# Patient Record
Sex: FEMALE | Race: WHITE | Employment: UNEMPLOYED | URBAN - METROPOLITAN AREA
[De-identification: names, ages, dates, MRNs, and addresses within clinical notes are randomized per-mention and may not be internally consistent; named-entity substitution may affect disease eponyms.]

---

## 2017-02-02 LAB
CRL: NORMAL CM
SAC DIAMETER: NORMAL CM

## 2018-02-22 ENCOUNTER — OFFICE VISIT (OUTPATIENT)
Dept: FAMILY MEDICINE CLINIC | Age: 33
End: 2018-02-22

## 2018-02-22 VITALS
TEMPERATURE: 95.2 F | WEIGHT: 118 LBS | SYSTOLIC BLOOD PRESSURE: 90 MMHG | DIASTOLIC BLOOD PRESSURE: 72 MMHG | HEART RATE: 64 BPM | RESPIRATION RATE: 12 BRPM | BODY MASS INDEX: 20.39 KG/M2 | OXYGEN SATURATION: 99 %

## 2018-02-22 DIAGNOSIS — R68.89 FLU-LIKE SYMPTOMS: Primary | ICD-10-CM

## 2018-02-22 DIAGNOSIS — R11.0 NAUSEA: ICD-10-CM

## 2018-02-22 DIAGNOSIS — R07.0 THROAT PAIN: ICD-10-CM

## 2018-02-22 DIAGNOSIS — R10.30 LOWER ABDOMINAL PAIN: ICD-10-CM

## 2018-02-22 LAB
BILIRUBIN, POC: NEGATIVE
BLOOD URINE, POC: NORMAL
CLARITY, POC: CLEAR
COLOR, POC: NORMAL
GLUCOSE URINE, POC: NEGATIVE
INFLUENZA A ANTIBODY: NEGATIVE
INFLUENZA B ANTIBODY: NEGATIVE
KETONES, POC: NEGATIVE
LEUKOCYTE EST, POC: NORMAL
NITRITE, POC: NEGATIVE
PH, POC: 6
PROTEIN, POC: 30
S PYO AG THROAT QL: NORMAL
SPECIFIC GRAVITY, POC: 1.03
UROBILINOGEN, POC: 0.2

## 2018-02-22 PROCEDURE — 99213 OFFICE O/P EST LOW 20 MIN: CPT | Performed by: FAMILY MEDICINE

## 2018-02-22 PROCEDURE — 87804 INFLUENZA ASSAY W/OPTIC: CPT | Performed by: FAMILY MEDICINE

## 2018-02-22 PROCEDURE — 81002 URINALYSIS NONAUTO W/O SCOPE: CPT | Performed by: FAMILY MEDICINE

## 2018-02-22 PROCEDURE — 87880 STREP A ASSAY W/OPTIC: CPT | Performed by: FAMILY MEDICINE

## 2018-02-22 RX ORDER — PROMETHAZINE HYDROCHLORIDE 12.5 MG/1
TABLET ORAL
Qty: 30 TABLET | Refills: 0 | Status: SHIPPED | OUTPATIENT
Start: 2018-02-22 | End: 2018-03-22

## 2018-02-22 NOTE — PROGRESS NOTES
tenderness . No rebound / guarding, non distended. No hepatosplenomegaly. No CVA tenderness. Skin: no rashes. Non tender. ASSESSMENT/  PLAN:  Nury Mari was seen today for influenza. Diagnoses and all orders for this visit:    Flu-like symptoms  -     POCT Influenza A/B- negative   -     Push fluids - water. Crook diet. Throat pain  -     POCT rapid strep A - negative   Lower abdominal pain  -     POCT Urinalysis no Micro  Nausea  -     Push fluids - water. -     Crook/ BRAT diet. -     Promethazine (PHENERGAN) 12.5 MG tablet; Take 1-2 po q 6 hours prn nausea. F/u if no improvement 3-4d/ prn increased symptoms or if fever occurs.

## 2018-03-22 ENCOUNTER — OFFICE VISIT (OUTPATIENT)
Dept: FAMILY MEDICINE CLINIC | Age: 33
End: 2018-03-22

## 2018-03-22 VITALS
WEIGHT: 117 LBS | HEART RATE: 75 BPM | OXYGEN SATURATION: 98 % | SYSTOLIC BLOOD PRESSURE: 111 MMHG | TEMPERATURE: 95.2 F | BODY MASS INDEX: 20.22 KG/M2 | RESPIRATION RATE: 16 BRPM | DIASTOLIC BLOOD PRESSURE: 78 MMHG

## 2018-03-22 DIAGNOSIS — F41.9 ANXIETY: ICD-10-CM

## 2018-03-22 DIAGNOSIS — J06.9 ACUTE URI: Primary | ICD-10-CM

## 2018-03-22 DIAGNOSIS — J02.9 ACUTE PHARYNGITIS, UNSPECIFIED ETIOLOGY: ICD-10-CM

## 2018-03-22 LAB — S PYO AG THROAT QL: NORMAL

## 2018-03-22 PROCEDURE — 99214 OFFICE O/P EST MOD 30 MIN: CPT | Performed by: FAMILY MEDICINE

## 2018-03-22 PROCEDURE — 87880 STREP A ASSAY W/OPTIC: CPT | Performed by: FAMILY MEDICINE

## 2018-03-22 RX ORDER — CITALOPRAM 20 MG/1
20 TABLET ORAL DAILY
Qty: 30 TABLET | Refills: 5 | Status: SHIPPED | OUTPATIENT
Start: 2018-03-22 | End: 2019-08-13

## 2018-03-22 RX ORDER — AMOXICILLIN 875 MG/1
875 TABLET, COATED ORAL 2 TIMES DAILY
Qty: 20 TABLET | Refills: 0 | Status: SHIPPED | OUTPATIENT
Start: 2018-03-22 | End: 2018-04-01

## 2018-03-22 RX ORDER — ALPRAZOLAM 0.5 MG/1
0.5 TABLET ORAL 3 TIMES DAILY PRN
Qty: 30 TABLET | Refills: 2 | Status: SHIPPED | OUTPATIENT
Start: 2018-03-22 | End: 2018-11-29 | Stop reason: SDUPTHER

## 2018-03-22 NOTE — PROGRESS NOTES
No suicidal thoughts or ideation        Current Outpatient Prescriptions   Medication Sig Dispense Refill    ALPRAZolam (XANAX) 0.5 MG tablet Take 1 tablet by mouth 3 times daily as needed for Anxiety for up to 30 days. 30 tablet 2    citalopram (CELEXA) 20 MG tablet Take 1 tablet by mouth daily 30 tablet 5    amoxicillin (AMOXIL) 875 MG tablet Take 1 tablet by mouth 2 times daily for 10 days 20 tablet 0    therapeutic multivitamin-minerals (THERAGRAN-M) tablet Take 1 tablet by mouth daily.  promethazine (PHENERGAN) 12.5 MG tablet Take 1-2 po q 6 hours prn nausea. 30 tablet 0    ibuprofen (ADVIL;MOTRIN) 600 MG tablet Take 1 tablet by mouth every 8 hours as needed for Pain. 90 tablet 11     No current facility-administered medications for this visit. ASSESSMENT / PLAN:    1. Anxiety  Increased sx off meds  Resume celexa to 20mg qd  Resume prn xanax, use intermittent  See CSM  refills given as below  Pt aware of need for every 3 month medication followup appointments, and that medication refills for benzodiazepines, narcotics and/or stimulants will only be given at appointment. - ALPRAZolam (XANAX) 0.5 MG tablet; Take 1 tablet by mouth 3 times daily as needed for Anxiety for up to 30 days. Dispense: 30 tablet; Refill: 2    2. Acute URI  c/w viral URI  Cont over the counter/symptomatic treatment. Follow up for persistent symptoms in 7 to 10 days or sooner for worsening symptomatology     3. Acute pharyngitis, unspecified etiology  c/w viral URI  Cont over the counter/symptomatic treatment. Follow up for persistent symptoms in 7 to 10 days or sooner for worsening symptomatology   - POCT rapid strep A negative           Follow-up appointment:   Call or return to clinic prn if these symptoms worsen or fail to improve as anticipated. Discussed use, benefit, and side effects of all prescribed medications. Barriers to medication compliance addressed. All patient questions answered.   Pt voiced understanding. When applicable, patient's outside records were reviewed through [facility]. The patient has signed appropriate paperworks/consents. Dragon dictation software was used for parts of this progress note. All attempts were made to correct any errors and ensure accuracy.

## 2018-11-29 ENCOUNTER — OFFICE VISIT (OUTPATIENT)
Dept: FAMILY MEDICINE CLINIC | Age: 33
End: 2018-11-29
Payer: COMMERCIAL

## 2018-11-29 VITALS
HEART RATE: 77 BPM | TEMPERATURE: 96 F | RESPIRATION RATE: 12 BRPM | OXYGEN SATURATION: 100 % | BODY MASS INDEX: 20.8 KG/M2 | WEIGHT: 117.4 LBS | SYSTOLIC BLOOD PRESSURE: 96 MMHG | DIASTOLIC BLOOD PRESSURE: 67 MMHG | HEIGHT: 63 IN

## 2018-11-29 DIAGNOSIS — R61 SWEATING INCREASE: ICD-10-CM

## 2018-11-29 DIAGNOSIS — F41.9 ANXIETY: ICD-10-CM

## 2018-11-29 DIAGNOSIS — Z00.00 ROUTINE GENERAL MEDICAL EXAMINATION AT A HEALTH CARE FACILITY: Primary | ICD-10-CM

## 2018-11-29 DIAGNOSIS — Z00.00 ROUTINE GENERAL MEDICAL EXAMINATION AT A HEALTH CARE FACILITY: ICD-10-CM

## 2018-11-29 DIAGNOSIS — Z80.3 FAMILY HISTORY OF BREAST CANCER: ICD-10-CM

## 2018-11-29 DIAGNOSIS — Z86.59 HISTORY OF EATING DISORDER: ICD-10-CM

## 2018-11-29 DIAGNOSIS — N60.19 FIBROCYSTIC BREAST DISEASE (FCBD), UNSPECIFIED LATERALITY: ICD-10-CM

## 2018-11-29 LAB
HCT VFR BLD CALC: 40.2 % (ref 36–48)
HEMOGLOBIN: 13.1 G/DL (ref 12–16)
MCH RBC QN AUTO: 30.3 PG (ref 26–34)
MCHC RBC AUTO-ENTMCNC: 32.6 G/DL (ref 31–36)
MCV RBC AUTO: 93.1 FL (ref 80–100)
PDW BLD-RTO: 13.3 % (ref 12.4–15.4)
PLATELET # BLD: 163 K/UL (ref 135–450)
PMV BLD AUTO: 11.3 FL (ref 5–10.5)
RBC # BLD: 4.32 M/UL (ref 4–5.2)
WBC # BLD: 5.5 K/UL (ref 4–11)

## 2018-11-29 PROCEDURE — 99395 PREV VISIT EST AGE 18-39: CPT | Performed by: FAMILY MEDICINE

## 2018-11-29 RX ORDER — CITALOPRAM 20 MG/1
20 TABLET ORAL DAILY
Qty: 30 TABLET | Refills: 5 | Status: CANCELLED | OUTPATIENT
Start: 2018-11-29

## 2018-11-29 RX ORDER — DESVENLAFAXINE 50 MG/1
50 TABLET, EXTENDED RELEASE ORAL DAILY
Qty: 30 TABLET | Refills: 5 | Status: SHIPPED | OUTPATIENT
Start: 2018-11-29 | End: 2018-12-05 | Stop reason: SDUPTHER

## 2018-11-29 RX ORDER — ALPRAZOLAM 0.5 MG/1
0.5 TABLET ORAL 3 TIMES DAILY PRN
Qty: 30 TABLET | Refills: 2 | Status: SHIPPED | OUTPATIENT
Start: 2018-11-29 | End: 2018-12-29

## 2018-11-29 ASSESSMENT — PATIENT HEALTH QUESTIONNAIRE - PHQ9
SUM OF ALL RESPONSES TO PHQ QUESTIONS 1-9: 0
2. FEELING DOWN, DEPRESSED OR HOPELESS: 0
SUM OF ALL RESPONSES TO PHQ9 QUESTIONS 1 & 2: 0
SUM OF ALL RESPONSES TO PHQ QUESTIONS 1-9: 0
1. LITTLE INTEREST OR PLEASURE IN DOING THINGS: 0

## 2018-11-29 NOTE — PROGRESS NOTES
Here for well checkup, physical.  Pt states that things are doing well overall. Pt states she has noted some increase in sweating, mostly in axilla bilaterally. Sx are pretty random, and not related to exertion. Sx are random, not triggered by anything, except may relate to anxiety. Pt does have long-term anxiety issues, but not any significantly different than prior. Pt has noted a minimal increase in stress. No lymph node in neck, axilla. No fever, chills. No weight gain or loss    Pt is strugglin to stay asleep, falls asleep ok but struggles to stay asleep. Pt does not know what wakes her up, maybe related to her baby daughter getting up or other kids, but at times can be random. Tends to find a lot of the time that she does tend to think about a lot over the night. Pt uses 1/2 xanax prn and that does help but only uses prn. Pt does continue to talk with her psychologist and that has been very helpful. Pt has been given celexa for daily usage, but has never taken. Pt is concerned about weight gain with medication. pts aunt diet of breast cancer(50), and mother  of cancer but was not sure where it was initially located. Unknown primary. Pt wants to know about genetic testing and early testing. Except as noted in the history of present illness as above, the review of systems is negative for the following:    General ROS: negative  Psychological ROS: negative  Allergy and Immunology ROS: negative  Hematological and Lymphatic ROS: negative  Respiratory ROS: no cough, shortness of breath, or wheezing  Cardiovascular ROS: no chest pain or dyspnea on exertion  Gastrointestinal ROS: no abdominal pain, change in bowel habits, or black or bloody stools  Genito-Urinary ROS: no dysuria, trouble voiding, or hematuria  Musculoskeletal ROS: negative  Dermatological ROS: negative      Past medical, surgical, and social history reviewed and updated.    Medications and allergies reviewed and

## 2018-11-30 LAB
A/G RATIO: 1.7 (ref 1.1–2.2)
ALBUMIN SERPL-MCNC: 4.6 G/DL (ref 3.4–5)
ALP BLD-CCNC: 44 U/L (ref 40–129)
ALT SERPL-CCNC: 12 U/L (ref 10–40)
ANION GAP SERPL CALCULATED.3IONS-SCNC: 12 MMOL/L (ref 3–16)
AST SERPL-CCNC: 20 U/L (ref 15–37)
BILIRUB SERPL-MCNC: 0.5 MG/DL (ref 0–1)
BUN BLDV-MCNC: 14 MG/DL (ref 7–20)
CALCIUM SERPL-MCNC: 10 MG/DL (ref 8.3–10.6)
CHLORIDE BLD-SCNC: 104 MMOL/L (ref 99–110)
CHOLESTEROL, TOTAL: 181 MG/DL (ref 0–199)
CO2: 26 MMOL/L (ref 21–32)
CREAT SERPL-MCNC: 0.8 MG/DL (ref 0.6–1.1)
FOLATE: 10.83 NG/ML (ref 4.78–24.2)
GFR AFRICAN AMERICAN: >60
GFR NON-AFRICAN AMERICAN: >60
GLOBULIN: 2.7 G/DL
GLUCOSE BLD-MCNC: 74 MG/DL (ref 70–99)
HDLC SERPL-MCNC: 72 MG/DL (ref 40–60)
LACTATE DEHYDROGENASE: 139 U/L (ref 100–190)
LDL CHOLESTEROL CALCULATED: 93 MG/DL
POTASSIUM SERPL-SCNC: 4.3 MMOL/L (ref 3.5–5.1)
SODIUM BLD-SCNC: 142 MMOL/L (ref 136–145)
T4 FREE: 1.1 NG/DL (ref 0.9–1.8)
TOTAL PROTEIN: 7.3 G/DL (ref 6.4–8.2)
TRIGL SERPL-MCNC: 78 MG/DL (ref 0–150)
TSH SERPL DL<=0.05 MIU/L-ACNC: 1.05 UIU/ML (ref 0.27–4.2)
VITAMIN B-12: 533 PG/ML (ref 211–911)
VLDLC SERPL CALC-MCNC: 16 MG/DL

## 2018-12-03 ENCOUNTER — TELEPHONE (OUTPATIENT)
Dept: FAMILY MEDICINE CLINIC | Age: 33
End: 2018-12-03

## 2018-12-04 NOTE — TELEPHONE ENCOUNTER
PA submitted for Desvenlafaxine Succinate ER 50MG er tablets on [facility]  (Key: Kylee Herrera) - [id number]    Pending review

## 2018-12-06 RX ORDER — DESVENLAFAXINE 50 MG/1
50 TABLET, EXTENDED RELEASE ORAL DAILY
Qty: 30 TABLET | Refills: 5 | Status: SHIPPED | OUTPATIENT
Start: 2018-12-06 | End: 2019-08-13

## 2018-12-17 NOTE — TELEPHONE ENCOUNTER
Prior Authorization for medication,   Desvenlafaxine Succinate ER has been DENIED by insurance [facility] due to it being non formulary. Please advise on appeal or change in medications. Please see attached scanned denial letter for more information.

## 2018-12-18 NOTE — TELEPHONE ENCOUNTER
Called and spoke with pt and she states with the card it is still high at  $155 monthly. She states she does not want to call her insurance for more information and she prefers an alternative medication be sent into the pharmacy.  Please review

## 2019-02-11 RX ORDER — GENTAMICIN SULFATE 3 MG/ML
1 SOLUTION/ DROPS OPHTHALMIC 4 TIMES DAILY
Qty: 1 BOTTLE | Refills: 5 | Status: SHIPPED | OUTPATIENT
Start: 2019-02-11 | End: 2019-02-18

## 2019-03-08 ENCOUNTER — TELEPHONE (OUTPATIENT)
Dept: FAMILY MEDICINE CLINIC | Age: 34
End: 2019-03-08

## 2019-03-08 RX ORDER — OSELTAMIVIR PHOSPHATE 75 MG/1
75 CAPSULE ORAL DAILY
Qty: 10 CAPSULE | Refills: 0 | Status: SHIPPED | OUTPATIENT
Start: 2019-03-08 | End: 2019-03-18

## 2019-03-20 ENCOUNTER — PATIENT MESSAGE (OUTPATIENT)
Dept: FAMILY MEDICINE CLINIC | Age: 34
End: 2019-03-20

## 2019-03-20 DIAGNOSIS — T75.3XXA MOTION SICKNESS, INITIAL ENCOUNTER: Primary | ICD-10-CM

## 2019-03-20 RX ORDER — SCOLOPAMINE TRANSDERMAL SYSTEM 1 MG/1
1 PATCH, EXTENDED RELEASE TRANSDERMAL
Qty: 10 PATCH | Refills: 0 | Status: SHIPPED | OUTPATIENT
Start: 2019-03-20 | End: 2019-08-13

## 2019-05-21 ENCOUNTER — TELEPHONE (OUTPATIENT)
Dept: FAMILY MEDICINE CLINIC | Age: 34
End: 2019-05-21

## 2019-05-21 RX ORDER — GENTAMICIN SULFATE 3 MG/ML
1 SOLUTION/ DROPS OPHTHALMIC 3 TIMES DAILY
Qty: 1 BOTTLE | Refills: 0 | Status: SHIPPED | OUTPATIENT
Start: 2019-05-21 | End: 2019-05-28

## 2019-08-06 ENCOUNTER — NURSE TRIAGE (OUTPATIENT)
Dept: OTHER | Facility: CLINIC | Age: 34
End: 2019-08-06

## 2019-08-13 ENCOUNTER — OFFICE VISIT (OUTPATIENT)
Dept: FAMILY MEDICINE CLINIC | Age: 34
End: 2019-08-13
Payer: COMMERCIAL

## 2019-08-13 VITALS
HEART RATE: 65 BPM | TEMPERATURE: 98 F | DIASTOLIC BLOOD PRESSURE: 71 MMHG | WEIGHT: 112.8 LBS | SYSTOLIC BLOOD PRESSURE: 96 MMHG | BODY MASS INDEX: 19.82 KG/M2 | OXYGEN SATURATION: 99 %

## 2019-08-13 DIAGNOSIS — R07.9 CHEST PAIN, UNSPECIFIED TYPE: ICD-10-CM

## 2019-08-13 DIAGNOSIS — R51.9 DAILY HEADACHE: Primary | ICD-10-CM

## 2019-08-13 DIAGNOSIS — G43.009 MIGRAINE WITHOUT AURA AND WITHOUT STATUS MIGRAINOSUS, NOT INTRACTABLE: ICD-10-CM

## 2019-08-13 DIAGNOSIS — R53.83 FATIGUE, UNSPECIFIED TYPE: ICD-10-CM

## 2019-08-13 DIAGNOSIS — N92.1 MENORRHAGIA WITH IRREGULAR CYCLE: ICD-10-CM

## 2019-08-13 DIAGNOSIS — F41.9 ANXIETY: ICD-10-CM

## 2019-08-13 LAB
A/G RATIO: 1.9 (ref 1.1–2.2)
ALBUMIN SERPL-MCNC: 4.8 G/DL (ref 3.4–5)
ALP BLD-CCNC: 43 U/L (ref 40–129)
ALT SERPL-CCNC: 17 U/L (ref 10–40)
ANION GAP SERPL CALCULATED.3IONS-SCNC: 14 MMOL/L (ref 3–16)
AST SERPL-CCNC: 20 U/L (ref 15–37)
BILIRUB SERPL-MCNC: 0.5 MG/DL (ref 0–1)
BUN BLDV-MCNC: 14 MG/DL (ref 7–20)
CALCIUM SERPL-MCNC: 10.3 MG/DL (ref 8.3–10.6)
CHLORIDE BLD-SCNC: 103 MMOL/L (ref 99–110)
CO2: 27 MMOL/L (ref 21–32)
CREAT SERPL-MCNC: 0.9 MG/DL (ref 0.6–1.1)
FERRITIN: 60.6 NG/ML (ref 15–150)
FOLATE: >20 NG/ML (ref 4.78–24.2)
GFR AFRICAN AMERICAN: >60
GFR NON-AFRICAN AMERICAN: >60
GLOBULIN: 2.5 G/DL
GLUCOSE BLD-MCNC: 81 MG/DL (ref 70–99)
HCT VFR BLD CALC: 40.6 % (ref 36–48)
HEMOGLOBIN: 13.5 G/DL (ref 12–16)
IRON SATURATION: 31 % (ref 15–50)
IRON: 100 UG/DL (ref 37–145)
MCH RBC QN AUTO: 31.6 PG (ref 26–34)
MCHC RBC AUTO-ENTMCNC: 33.3 G/DL (ref 31–36)
MCV RBC AUTO: 94.7 FL (ref 80–100)
PDW BLD-RTO: 13 % (ref 12.4–15.4)
PLATELET # BLD: 188 K/UL (ref 135–450)
PMV BLD AUTO: 12 FL (ref 5–10.5)
POTASSIUM SERPL-SCNC: 4.2 MMOL/L (ref 3.5–5.1)
PREALBUMIN: 29.5 MG/DL (ref 20–40)
RBC # BLD: 4.29 M/UL (ref 4–5.2)
SODIUM BLD-SCNC: 144 MMOL/L (ref 136–145)
T4 FREE: 1 NG/DL (ref 0.9–1.8)
TOTAL IRON BINDING CAPACITY: 323 UG/DL (ref 260–445)
TOTAL PROTEIN: 7.3 G/DL (ref 6.4–8.2)
TSH SERPL DL<=0.05 MIU/L-ACNC: 1.42 UIU/ML (ref 0.27–4.2)
VITAMIN B-12: 570 PG/ML (ref 211–911)
VITAMIN D 25-HYDROXY: 39.2 NG/ML
WBC # BLD: 4.2 K/UL (ref 4–11)

## 2019-08-13 PROCEDURE — 93000 ELECTROCARDIOGRAM COMPLETE: CPT | Performed by: FAMILY MEDICINE

## 2019-08-13 PROCEDURE — 99214 OFFICE O/P EST MOD 30 MIN: CPT | Performed by: FAMILY MEDICINE

## 2019-08-14 LAB
ESTIMATED AVERAGE GLUCOSE: 108.3 MG/DL
HBA1C MFR BLD: 5.4 %

## 2019-08-15 ENCOUNTER — HOSPITAL ENCOUNTER (OUTPATIENT)
Dept: MRI IMAGING | Age: 34
Discharge: HOME | End: 2019-08-15
Payer: COMMERCIAL

## 2019-08-15 ENCOUNTER — OFFICE VISIT (OUTPATIENT)
Dept: FAMILY MEDICINE CLINIC | Age: 34
End: 2019-08-15
Payer: COMMERCIAL

## 2019-08-15 VITALS
HEART RATE: 65 BPM | OXYGEN SATURATION: 99 % | DIASTOLIC BLOOD PRESSURE: 71 MMHG | RESPIRATION RATE: 14 BRPM | SYSTOLIC BLOOD PRESSURE: 96 MMHG

## 2019-08-15 DIAGNOSIS — R42 DIZZINESS: ICD-10-CM

## 2019-08-15 DIAGNOSIS — G43.009 MIGRAINE WITHOUT AURA AND WITHOUT STATUS MIGRAINOSUS, NOT INTRACTABLE: ICD-10-CM

## 2019-08-15 DIAGNOSIS — R51.9 DAILY HEADACHE: Primary | ICD-10-CM

## 2019-08-15 DIAGNOSIS — R53.83 FATIGUE, UNSPECIFIED TYPE: ICD-10-CM

## 2019-08-15 DIAGNOSIS — N92.1 MENORRHAGIA WITH IRREGULAR CYCLE: ICD-10-CM

## 2019-08-15 DIAGNOSIS — R35.0 URINARY FREQUENCY: ICD-10-CM

## 2019-08-15 DIAGNOSIS — R51.9 DAILY HEADACHE: ICD-10-CM

## 2019-08-15 LAB
BILIRUBIN, POC: NEGATIVE
BLOOD URINE, POC: NEGATIVE
CLARITY, POC: ABNORMAL
COLOR, POC: YELLOW
CONTROL: NORMAL
FOLLICLE STIMULATING HORMONE: 9.9 MIU/ML
GLUCOSE URINE, POC: NEGATIVE
KETONES, POC: NEGATIVE
LEUKOCYTE EST, POC: ABNORMAL
LUTEINIZING HORMONE: 4 MIU/ML
NITRITE, POC: NEGATIVE
PH, POC: 6.5
PREGNANCY TEST URINE, POC: NEGATIVE
PROTEIN, POC: ABNORMAL
SPECIFIC GRAVITY, POC: 1.01
UROBILINOGEN, POC: 0.2

## 2019-08-15 PROCEDURE — 70553 MRI BRAIN STEM W/O & W/DYE: CPT

## 2019-08-15 PROCEDURE — 81025 URINE PREGNANCY TEST: CPT | Performed by: FAMILY MEDICINE

## 2019-08-15 PROCEDURE — 99214 OFFICE O/P EST MOD 30 MIN: CPT | Performed by: FAMILY MEDICINE

## 2019-08-15 PROCEDURE — 2580000003 HC RX 258: Performed by: FAMILY MEDICINE

## 2019-08-15 PROCEDURE — 6360000004 HC RX CONTRAST MEDICATION: Performed by: FAMILY MEDICINE

## 2019-08-15 PROCEDURE — 81002 URINALYSIS NONAUTO W/O SCOPE: CPT | Performed by: FAMILY MEDICINE

## 2019-08-15 PROCEDURE — A9579 GAD-BASE MR CONTRAST NOS,1ML: HCPCS | Performed by: FAMILY MEDICINE

## 2019-08-15 RX ORDER — SODIUM CHLORIDE 0.9 % (FLUSH) 0.9 %
10 SYRINGE (ML) INJECTION ONCE
Status: COMPLETED | OUTPATIENT
Start: 2019-08-15 | End: 2019-08-15

## 2019-08-15 RX ORDER — SULFAMETHOXAZOLE AND TRIMETHOPRIM 800; 160 MG/1; MG/1
1 TABLET ORAL 2 TIMES DAILY
Qty: 14 TABLET | Refills: 0 | Status: SHIPPED | OUTPATIENT
Start: 2019-08-15 | End: 2019-08-22

## 2019-08-15 RX ADMIN — Medication 10 ML: at 18:25

## 2019-08-15 RX ADMIN — GADOTERIDOL 9 ML: 279.3 INJECTION, SOLUTION INTRAVENOUS at 18:23

## 2019-08-15 ASSESSMENT — PATIENT HEALTH QUESTIONNAIRE - PHQ9
1. LITTLE INTEREST OR PLEASURE IN DOING THINGS: 0
SUM OF ALL RESPONSES TO PHQ QUESTIONS 1-9: 0
SUM OF ALL RESPONSES TO PHQ QUESTIONS 1-9: 0
2. FEELING DOWN, DEPRESSED OR HOPELESS: 0
SUM OF ALL RESPONSES TO PHQ9 QUESTIONS 1 & 2: 0

## 2019-08-15 NOTE — PROGRESS NOTES
Yellow     Clarity, UA Hazy     Glucose, UA POC Negative     Bilirubin, UA Negative     Ketones, UA Negative     Spec Grav, UA 1.010     Blood, UA POC Negative     pH, UA 6.5     Protein, UA POC Trace     Urobilinogen, UA 0.2     Leukocytes, UA Small     Nitrite, UA Negative          ASSESSMENT / PLAN:    1. Daily headache  Persistent sx, no neurologic sx  D/t persistent HA and increased frequency and severity, will check MRI brain   Management pending results. - MRI BRAIN W WO CONTRAST; Future    2. Fatigue, unspecified type  bloodwork normal, reviewed as above  Await MRI  ? If related to anxeity    3. Migraine without aura and without status migrainosus, not intractable  Await MRI brain  Management pending results. 4. Urinary frequency  UA with trace LE, check urine cx and tx with bactrim DS BID x 7d  - POCT Urinalysis no Micro  - POCT urine pregnancy  - Urine Culture    5. Dizziness  - MRI BRAIN W WO CONTRAST; Future           Follow-up appointment:   Pending MRI/prn    Discussed use, benefit, and side effects of all prescribed medications. Barriers to medication compliance addressed. All patient questions answered. Pt voiced understanding. When applicable, patient's outside records were reviewed through [facility]. The patient has signed appropriate paperworks/consents.

## 2019-08-17 LAB — URINE CULTURE, ROUTINE: NORMAL

## 2019-08-19 ENCOUNTER — TELEPHONE (OUTPATIENT)
Dept: FAMILY MEDICINE CLINIC | Age: 34
End: 2019-08-19

## 2019-08-20 ENCOUNTER — TELEPHONE (OUTPATIENT)
Dept: FAMILY MEDICINE CLINIC | Age: 34
End: 2019-08-20

## 2019-08-20 ENCOUNTER — OFFICE VISIT (OUTPATIENT)
Dept: FAMILY MEDICINE CLINIC | Age: 34
End: 2019-08-20
Payer: COMMERCIAL

## 2019-08-20 VITALS
RESPIRATION RATE: 16 BRPM | TEMPERATURE: 98.3 F | BODY MASS INDEX: 19.77 KG/M2 | SYSTOLIC BLOOD PRESSURE: 112 MMHG | HEART RATE: 93 BPM | DIASTOLIC BLOOD PRESSURE: 80 MMHG | OXYGEN SATURATION: 99 % | WEIGHT: 112.5 LBS

## 2019-08-20 DIAGNOSIS — I30.0 ACUTE IDIOPATHIC PERICARDITIS: Primary | ICD-10-CM

## 2019-08-20 DIAGNOSIS — F41.9 ANXIETY: ICD-10-CM

## 2019-08-20 DIAGNOSIS — G43.009 MIGRAINE WITHOUT AURA AND WITHOUT STATUS MIGRAINOSUS, NOT INTRACTABLE: ICD-10-CM

## 2019-08-20 PROCEDURE — 99214 OFFICE O/P EST MOD 30 MIN: CPT | Performed by: FAMILY MEDICINE

## 2019-08-20 RX ORDER — ALPRAZOLAM 0.5 MG/1
0.5 TABLET ORAL 3 TIMES DAILY PRN
Qty: 30 TABLET | Refills: 2 | Status: SHIPPED | OUTPATIENT
Start: 2019-08-20 | End: 2019-09-19

## 2019-08-20 RX ORDER — INDOMETHACIN 50 MG/1
50 CAPSULE ORAL 3 TIMES DAILY
Qty: 60 CAPSULE | Refills: 3 | Status: SHIPPED | OUTPATIENT
Start: 2019-08-20 | End: 2020-05-19

## 2019-08-20 NOTE — TELEPHONE ENCOUNTER
Patient called c/o increasing migraine and lethargy with CP and some SOB. Dx with pericarditis in ED on Sunday. [place] better this am but has deteriorated this evening. No relief with ibuprofen and colchicine as directed. ED cardiology consult  notes indicate pt to return to ED for worsening. Pt instructed to return to ED for follow up eval if she feels she is unable to wait to see Dr. Baruch Boxer in am.  Does not want to wait in ED.

## 2019-08-20 NOTE — TELEPHONE ENCOUNTER
Unable to reach patient. I LMOM advising patient of appointment and asking that she call back asap to confirm she will be keeping 12:50 appointment today.

## 2019-08-21 ENCOUNTER — OFFICE VISIT (OUTPATIENT)
Dept: FAMILY MEDICINE CLINIC | Age: 34
End: 2019-08-21
Payer: COMMERCIAL

## 2019-08-21 ENCOUNTER — TELEPHONE (OUTPATIENT)
Dept: FAMILY MEDICINE CLINIC | Age: 34
End: 2019-08-21

## 2019-08-21 VITALS
WEIGHT: 111 LBS | RESPIRATION RATE: 16 BRPM | SYSTOLIC BLOOD PRESSURE: 102 MMHG | DIASTOLIC BLOOD PRESSURE: 60 MMHG | TEMPERATURE: 96.6 F | OXYGEN SATURATION: 98 % | HEART RATE: 84 BPM | BODY MASS INDEX: 19.51 KG/M2

## 2019-08-21 DIAGNOSIS — R42 LIGHT HEADED: ICD-10-CM

## 2019-08-21 DIAGNOSIS — G44.40 REBOUND HEADACHE: Primary | ICD-10-CM

## 2019-08-21 DIAGNOSIS — M94.0 COSTOCHONDRITIS: ICD-10-CM

## 2019-08-21 DIAGNOSIS — I30.0 ACUTE IDIOPATHIC PERICARDITIS: ICD-10-CM

## 2019-08-21 LAB
BASOPHILS ABSOLUTE: 0 K/UL (ref 0–0.2)
BASOPHILS RELATIVE PERCENT: 0.6 %
C-REACTIVE PROTEIN: <0.3 MG/L (ref 0–5.1)
EOSINOPHILS ABSOLUTE: 0 K/UL (ref 0–0.6)
EOSINOPHILS RELATIVE PERCENT: 0.9 %
HCT VFR BLD CALC: 39.2 % (ref 36–48)
HEMOGLOBIN: 13.2 G/DL (ref 12–16)
LYMPHOCYTES ABSOLUTE: 1.6 K/UL (ref 1–5.1)
LYMPHOCYTES RELATIVE PERCENT: 31.5 %
MCH RBC QN AUTO: 31.2 PG (ref 26–34)
MCHC RBC AUTO-ENTMCNC: 33.7 G/DL (ref 31–36)
MCV RBC AUTO: 92.5 FL (ref 80–100)
MONOCYTES ABSOLUTE: 0.4 K/UL (ref 0–1.3)
MONOCYTES RELATIVE PERCENT: 6.9 %
NEUTROPHILS ABSOLUTE: 3.1 K/UL (ref 1.7–7.7)
NEUTROPHILS RELATIVE PERCENT: 60.1 %
PDW BLD-RTO: 12.5 % (ref 12.4–15.4)
PLATELET # BLD: 217 K/UL (ref 135–450)
PMV BLD AUTO: 11.5 FL (ref 5–10.5)
RBC # BLD: 4.24 M/UL (ref 4–5.2)
WBC # BLD: 5.1 K/UL (ref 4–11)

## 2019-08-21 PROCEDURE — 99214 OFFICE O/P EST MOD 30 MIN: CPT | Performed by: FAMILY MEDICINE

## 2019-08-21 RX ORDER — OMEPRAZOLE 20 MG/1
20 CAPSULE, DELAYED RELEASE ORAL DAILY
COMMUNITY
End: 2019-09-20

## 2019-08-21 RX ORDER — COLCHICINE 0.6 MG/1
0.6 TABLET ORAL DAILY
COMMUNITY
End: 2019-09-20

## 2019-08-21 RX ORDER — LIDOCAINE 4 G/G
1 PATCH TOPICAL DAILY
Qty: 30 PATCH | Refills: 0 | COMMUNITY
Start: 2019-08-21 | End: 2019-09-20

## 2019-08-21 RX ORDER — METHYLPREDNISOLONE 4 MG/1
TABLET ORAL
Qty: 1 KIT | Refills: 0 | Status: SHIPPED | OUTPATIENT
Start: 2019-08-21 | End: 2019-09-20

## 2019-08-23 ENCOUNTER — HOSPITAL ENCOUNTER (INPATIENT)
Age: 34
LOS: 4 days | Discharge: HOME | DRG: 316 | End: 2019-08-27
Attending: INTERNAL MEDICINE | Admitting: INTERNAL MEDICINE
Payer: COMMERCIAL

## 2019-08-23 ENCOUNTER — OFFICE VISIT (OUTPATIENT)
Dept: FAMILY MEDICINE CLINIC | Age: 34
End: 2019-08-23
Payer: COMMERCIAL

## 2019-08-23 VITALS
BODY MASS INDEX: 18.78 KG/M2 | WEIGHT: 110 LBS | HEIGHT: 64 IN | HEART RATE: 86 BPM | DIASTOLIC BLOOD PRESSURE: 97 MMHG | SYSTOLIC BLOOD PRESSURE: 130 MMHG | OXYGEN SATURATION: 100 %

## 2019-08-23 DIAGNOSIS — R53.81 MALAISE: ICD-10-CM

## 2019-08-23 DIAGNOSIS — F41.9 ANXIETY: ICD-10-CM

## 2019-08-23 DIAGNOSIS — G43.009 MIGRAINE WITHOUT AURA AND WITHOUT STATUS MIGRAINOSUS, NOT INTRACTABLE: Primary | ICD-10-CM

## 2019-08-23 DIAGNOSIS — M43.6 NECK STIFFNESS: ICD-10-CM

## 2019-08-23 DIAGNOSIS — I30.0 ACUTE IDIOPATHIC PERICARDITIS: ICD-10-CM

## 2019-08-23 PROBLEM — I31.9 PERICARDITIS: Status: ACTIVE | Noted: 2019-08-23

## 2019-08-23 LAB
ALBUMIN SERPL-MCNC: 4.6 G/DL (ref 3.4–5)
ANION GAP SERPL CALCULATED.3IONS-SCNC: 10 MMOL/L (ref 3–16)
BASOPHILS ABSOLUTE: 0.1 K/UL (ref 0–0.2)
BASOPHILS RELATIVE PERCENT: 0.7 %
BUN BLDV-MCNC: 10 MG/DL (ref 7–20)
C-REACTIVE PROTEIN: <0.3 MG/L (ref 0–5.1)
CALCIUM SERPL-MCNC: 9.4 MG/DL (ref 8.3–10.6)
CHLORIDE BLD-SCNC: 102 MMOL/L (ref 99–110)
CO2: 25 MMOL/L (ref 21–32)
CREAT SERPL-MCNC: 0.7 MG/DL (ref 0.6–1.1)
EOSINOPHILS ABSOLUTE: 0 K/UL (ref 0–0.6)
EOSINOPHILS RELATIVE PERCENT: 0 %
GFR AFRICAN AMERICAN: >60
GFR NON-AFRICAN AMERICAN: >60
GLUCOSE BLD-MCNC: 118 MG/DL (ref 70–99)
HCT VFR BLD CALC: 38.7 % (ref 36–48)
HEMOGLOBIN: 13 G/DL (ref 12–16)
LYMPHOCYTES ABSOLUTE: 1.2 K/UL (ref 1–5.1)
LYMPHOCYTES RELATIVE PERCENT: 16.7 %
MAGNESIUM: 2 MG/DL (ref 1.8–2.4)
MCH RBC QN AUTO: 31.6 PG (ref 26–34)
MCHC RBC AUTO-ENTMCNC: 33.6 G/DL (ref 31–36)
MCV RBC AUTO: 94 FL (ref 80–100)
MONOCYTES ABSOLUTE: 0.4 K/UL (ref 0–1.3)
MONOCYTES RELATIVE PERCENT: 5.6 %
NEUTROPHILS ABSOLUTE: 5.6 K/UL (ref 1.7–7.7)
NEUTROPHILS RELATIVE PERCENT: 77 %
PDW BLD-RTO: 12.4 % (ref 12.4–15.4)
PHOSPHORUS: 3.4 MG/DL (ref 2.5–4.9)
PLATELET # BLD: 210 K/UL (ref 135–450)
PMV BLD AUTO: 9.9 FL (ref 5–10.5)
POTASSIUM SERPL-SCNC: 4 MMOL/L (ref 3.5–5.1)
RBC # BLD: 4.12 M/UL (ref 4–5.2)
SEDIMENTATION RATE, ERYTHROCYTE: 5 MM/HR (ref 0–20)
SODIUM BLD-SCNC: 137 MMOL/L (ref 136–145)
WBC # BLD: 7.3 K/UL (ref 4–11)

## 2019-08-23 PROCEDURE — 6370000000 HC RX 637 (ALT 250 FOR IP): Performed by: INTERNAL MEDICINE

## 2019-08-23 PROCEDURE — 85652 RBC SED RATE AUTOMATED: CPT

## 2019-08-23 PROCEDURE — 2580000003 HC RX 258: Performed by: INTERNAL MEDICINE

## 2019-08-23 PROCEDURE — 99214 OFFICE O/P EST MOD 30 MIN: CPT | Performed by: FAMILY MEDICINE

## 2019-08-23 PROCEDURE — 36415 COLL VENOUS BLD VENIPUNCTURE: CPT

## 2019-08-23 PROCEDURE — 85025 COMPLETE CBC W/AUTO DIFF WBC: CPT

## 2019-08-23 PROCEDURE — 2060000000 HC ICU INTERMEDIATE R&B

## 2019-08-23 PROCEDURE — 80069 RENAL FUNCTION PANEL: CPT

## 2019-08-23 PROCEDURE — 86140 C-REACTIVE PROTEIN: CPT

## 2019-08-23 PROCEDURE — 6360000002 HC RX W HCPCS: Performed by: INTERNAL MEDICINE

## 2019-08-23 PROCEDURE — 83735 ASSAY OF MAGNESIUM: CPT

## 2019-08-23 RX ORDER — SODIUM CHLORIDE 0.9 % (FLUSH) 0.9 %
10 SYRINGE (ML) INJECTION PRN
Status: DISCONTINUED | OUTPATIENT
Start: 2019-08-23 | End: 2019-08-27 | Stop reason: HOSPADM

## 2019-08-23 RX ORDER — METHYLPREDNISOLONE 4 MG/1
4 TABLET ORAL
Status: COMPLETED | OUTPATIENT
Start: 2019-08-23 | End: 2019-08-23

## 2019-08-23 RX ORDER — ONDANSETRON 2 MG/ML
4 INJECTION INTRAMUSCULAR; INTRAVENOUS EVERY 6 HOURS PRN
Status: DISCONTINUED | OUTPATIENT
Start: 2019-08-23 | End: 2019-08-23

## 2019-08-23 RX ORDER — ALPRAZOLAM 0.5 MG/1
0.5 TABLET ORAL 3 TIMES DAILY PRN
Status: DISCONTINUED | OUTPATIENT
Start: 2019-08-23 | End: 2019-08-25

## 2019-08-23 RX ORDER — METHYLPREDNISOLONE 4 MG/1
4 TABLET ORAL
Status: DISCONTINUED | OUTPATIENT
Start: 2019-08-24 | End: 2019-08-25

## 2019-08-23 RX ORDER — COLCHICINE 0.6 MG/1
0.6 TABLET ORAL DAILY
Status: DISCONTINUED | OUTPATIENT
Start: 2019-08-23 | End: 2019-08-27 | Stop reason: HOSPADM

## 2019-08-23 RX ORDER — SODIUM CHLORIDE 0.9 % (FLUSH) 0.9 %
10 SYRINGE (ML) INJECTION EVERY 12 HOURS SCHEDULED
Status: DISCONTINUED | OUTPATIENT
Start: 2019-08-23 | End: 2019-08-27 | Stop reason: HOSPADM

## 2019-08-23 RX ORDER — BUTALBITAL, ACETAMINOPHEN AND CAFFEINE 50; 325; 40 MG/1; MG/1; MG/1
1 TABLET ORAL EVERY 4 HOURS PRN
Status: DISCONTINUED | OUTPATIENT
Start: 2019-08-23 | End: 2019-08-26

## 2019-08-23 RX ORDER — SODIUM CHLORIDE 9 MG/ML
INJECTION, SOLUTION INTRAVENOUS CONTINUOUS
Status: DISCONTINUED | OUTPATIENT
Start: 2019-08-23 | End: 2019-08-27 | Stop reason: HOSPADM

## 2019-08-23 RX ORDER — LIDOCAINE 4 G/G
1 PATCH TOPICAL DAILY
Status: DISCONTINUED | OUTPATIENT
Start: 2019-08-23 | End: 2019-08-27 | Stop reason: HOSPADM

## 2019-08-23 RX ORDER — METHYLPREDNISOLONE 4 MG/1
4 TABLET ORAL NIGHTLY
Status: DISCONTINUED | OUTPATIENT
Start: 2019-08-23 | End: 2019-08-25

## 2019-08-23 RX ORDER — PANTOPRAZOLE SODIUM 40 MG/1
40 TABLET, DELAYED RELEASE ORAL
Status: DISCONTINUED | OUTPATIENT
Start: 2019-08-24 | End: 2019-08-24

## 2019-08-23 RX ORDER — SUMATRIPTAN 50 MG/1
50 TABLET, FILM COATED ORAL ONCE
Status: COMPLETED | OUTPATIENT
Start: 2019-08-23 | End: 2019-08-23

## 2019-08-23 RX ORDER — PROCHLORPERAZINE EDISYLATE 5 MG/ML
10 INJECTION INTRAMUSCULAR; INTRAVENOUS EVERY 6 HOURS PRN
Status: DISCONTINUED | OUTPATIENT
Start: 2019-08-23 | End: 2019-08-27 | Stop reason: HOSPADM

## 2019-08-23 RX ORDER — INDOMETHACIN 50 MG/1
50 CAPSULE ORAL 3 TIMES DAILY
Status: DISCONTINUED | OUTPATIENT
Start: 2019-08-23 | End: 2019-08-24

## 2019-08-23 RX ORDER — M-VIT,TX,IRON,MINS/CALC/FOLIC 27MG-0.4MG
1 TABLET ORAL DAILY
Status: DISCONTINUED | OUTPATIENT
Start: 2019-08-24 | End: 2019-08-27 | Stop reason: HOSPADM

## 2019-08-23 RX ADMIN — INDOMETHACIN 50 MG: 50 CAPSULE ORAL at 21:29

## 2019-08-23 RX ADMIN — METHYLPREDNISOLONE 4 MG: 4 TABLET ORAL at 21:25

## 2019-08-23 RX ADMIN — SODIUM CHLORIDE: 900 INJECTION, SOLUTION INTRAVENOUS at 20:10

## 2019-08-23 RX ADMIN — COLCHICINE 0.6 MG: 0.6 TABLET, FILM COATED ORAL at 18:47

## 2019-08-23 RX ADMIN — ACYCLOVIR SODIUM 490 MG: 50 INJECTION, SOLUTION INTRAVENOUS at 21:25

## 2019-08-23 RX ADMIN — SUMATRIPTAN SUCCINATE 50 MG: 50 TABLET ORAL at 21:25

## 2019-08-23 RX ADMIN — ALPRAZOLAM 0.5 MG: 0.5 TABLET ORAL at 22:31

## 2019-08-23 RX ADMIN — Medication 10 ML: at 18:48

## 2019-08-23 RX ADMIN — METHYLPREDNISOLONE 4 MG: 4 TABLET ORAL at 21:26

## 2019-08-23 RX ADMIN — CEFTRIAXONE SODIUM 2 G: 2 INJECTION, POWDER, FOR SOLUTION INTRAMUSCULAR; INTRAVENOUS at 22:00

## 2019-08-23 RX ADMIN — VANCOMYCIN HYDROCHLORIDE 1000 MG: 10 INJECTION, POWDER, LYOPHILIZED, FOR SOLUTION INTRAVENOUS at 22:31

## 2019-08-23 RX ADMIN — BUTALBITAL, ACETAMINOPHEN, AND CAFFEINE 1 TABLET: 50; 325; 40 TABLET ORAL at 20:06

## 2019-08-23 RX ADMIN — ONDANSETRON 4 MG: 2 INJECTION INTRAMUSCULAR; INTRAVENOUS at 20:06

## 2019-08-23 RX ADMIN — Medication 10 ML: at 20:11

## 2019-08-23 ASSESSMENT — PAIN DESCRIPTION - FREQUENCY
FREQUENCY: CONTINUOUS

## 2019-08-23 ASSESSMENT — PAIN DESCRIPTION - LOCATION
LOCATION: NECK
LOCATION: HEAD;NECK
LOCATION: NECK

## 2019-08-23 ASSESSMENT — PAIN SCALES - GENERAL
PAINLEVEL_OUTOF10: 9
PAINLEVEL_OUTOF10: 10
PAINLEVEL_OUTOF10: 9
PAINLEVEL_OUTOF10: 8
PAINLEVEL_OUTOF10: 0

## 2019-08-23 ASSESSMENT — PAIN DESCRIPTION - DESCRIPTORS
DESCRIPTORS: OTHER (COMMENT)
DESCRIPTORS: OTHER (COMMENT)

## 2019-08-23 ASSESSMENT — PAIN DESCRIPTION - PAIN TYPE
TYPE: ACUTE PAIN

## 2019-08-23 ASSESSMENT — PAIN DESCRIPTION - ORIENTATION
ORIENTATION: POSTERIOR
ORIENTATION: POSTERIOR

## 2019-08-23 NOTE — H&P
Pain Edu? Excl. in [address]? Medications   therapeutic multivitamin-minerals 1 tablet (has no administration in time range)   indomethacin (INDOCIN) capsule 50 mg (has no administration in time range)   ALPRAZolam (XANAX) tablet 0.5 mg (has no administration in time range)   pantoprazole (PROTONIX) tablet 40 mg (has no administration in time range)   colchicine (COLCRYS) tablet 0.6 mg (has no administration in time range)   lidocaine 4 % external patch 1 patch (has no administration in time range)   sodium chloride flush 0.9 % injection 10 mL (has no administration in time range)   sodium chloride flush 0.9 % injection 10 mL (has no administration in time range)   magnesium hydroxide (MILK OF MAGNESIA) 400 MG/5ML suspension 30 mL (has no administration in time range)   ondansetron (ZOFRAN) injection 4 mg (has no administration in time range)         Past Medical History:        Diagnosis Date    Anxiety 2/19/2015    Fibrocystic breast disease 2/3/2012    History of eating disorder     Migraine headache 2/3/2012       Past Surgical History:        Procedure Laterality Date    COLPOSCOPY      SALPINGO-OOPHORECTOMY Left 2014    TYMPANOSTOMY TUBE PLACEMENT         Medications Prior to Admission:    Medications Prior to Admission: omeprazole (PRILOSEC) 20 MG delayed release capsule, Take 20 mg by mouth daily  colchicine (COLCRYS) 0.6 MG tablet, Take 0.6 mg by mouth daily  methylPREDNISolone (MEDROL, IVONE,) 4 MG tablet, Take as directed on package insert  lidocaine 4 % external patch, Place 1 patch onto the skin daily  indomethacin (INDOCIN) 50 MG capsule, Take 1 capsule by mouth 3 times daily  ALPRAZolam (XANAX) 0.5 MG tablet, Take 1 tablet by mouth 3 times daily as needed for Anxiety for up to 30 days. therapeutic multivitamin-minerals (THERAGRAN-M) tablet, Take 1 tablet by mouth daily. Allergies:  Patient has no known allergies. Social History:   TOBACCO:   reports that she has never smoked.  She has brain negative. IR C/s for LP  Empiric acyclovir,vanc and rocephin.IVF. [facility] Re[facility] Neuro eval    3.  Anxiety   cont prn xanax    DVT prophy  ____________________________  N.MD JUJU  Hospitalist Service

## 2019-08-24 ENCOUNTER — APPOINTMENT (OUTPATIENT)
Dept: GENERAL RADIOLOGY | Age: 34
DRG: 316 | End: 2019-08-24
Attending: INTERNAL MEDICINE
Payer: COMMERCIAL

## 2019-08-24 LAB
ALBUMIN SERPL-MCNC: 3.9 G/DL (ref 3.4–5)
ALP BLD-CCNC: 38 U/L (ref 40–129)
ALT SERPL-CCNC: 12 U/L (ref 10–40)
ANION GAP SERPL CALCULATED.3IONS-SCNC: 10 MMOL/L (ref 3–16)
APPEARANCE CSF: CLEAR
AST SERPL-CCNC: 16 U/L (ref 15–37)
BILIRUB SERPL-MCNC: 0.4 MG/DL (ref 0–1)
BILIRUBIN DIRECT: <0.2 MG/DL (ref 0–0.3)
BILIRUBIN, INDIRECT: ABNORMAL MG/DL (ref 0–1)
BUN BLDV-MCNC: 8 MG/DL (ref 7–20)
CALCIUM SERPL-MCNC: 9.2 MG/DL (ref 8.3–10.6)
CHLORIDE BLD-SCNC: 104 MMOL/L (ref 99–110)
CLOT EVALUATION CSF: NORMAL
CO2: 26 MMOL/L (ref 21–32)
COLOR CSF: COLORLESS
CREAT SERPL-MCNC: 0.9 MG/DL (ref 0.6–1.1)
GFR AFRICAN AMERICAN: >60
GFR NON-AFRICAN AMERICAN: >60
GLUCOSE BLD-MCNC: 125 MG/DL (ref 70–99)
GLUCOSE, CSF: 75 MG/DL (ref 40–80)
NO DIFFERENTIAL CSF: NORMAL
POTASSIUM REFLEX MAGNESIUM: 4 MMOL/L (ref 3.5–5.1)
PROTEIN CSF: 36 MG/DL (ref 15–45)
RBC CSF: 0 /CUMM
SODIUM BLD-SCNC: 140 MMOL/L (ref 136–145)
TOTAL PROTEIN: 6.6 G/DL (ref 6.4–8.2)
TUBE NUMBER CSF: NORMAL
WBC CSF: 1 /CUMM (ref 0–5)

## 2019-08-24 PROCEDURE — 93005 ELECTROCARDIOGRAM TRACING: CPT | Performed by: INTERNAL MEDICINE

## 2019-08-24 PROCEDURE — 82945 GLUCOSE OTHER FLUID: CPT

## 2019-08-24 PROCEDURE — 80076 HEPATIC FUNCTION PANEL: CPT

## 2019-08-24 PROCEDURE — 6370000000 HC RX 637 (ALT 250 FOR IP): Performed by: INTERNAL MEDICINE

## 2019-08-24 PROCEDURE — 62270 DX LMBR SPI PNXR: CPT

## 2019-08-24 PROCEDURE — 2500000003 HC RX 250 WO HCPCS: Performed by: INTERNAL MEDICINE

## 2019-08-24 PROCEDURE — 86702 HIV-2 ANTIBODY: CPT

## 2019-08-24 PROCEDURE — 89050 BODY FLUID CELL COUNT: CPT

## 2019-08-24 PROCEDURE — 6370000000 HC RX 637 (ALT 250 FOR IP): Performed by: PSYCHIATRY & NEUROLOGY

## 2019-08-24 PROCEDURE — 2580000003 HC RX 258: Performed by: INTERNAL MEDICINE

## 2019-08-24 PROCEDURE — 88112 CYTOPATH CELL ENHANCE TECH: CPT

## 2019-08-24 PROCEDURE — 86701 HIV-1ANTIBODY: CPT

## 2019-08-24 PROCEDURE — 36415 COLL VENOUS BLD VENIPUNCTURE: CPT

## 2019-08-24 PROCEDURE — 87529 HSV DNA AMP PROBE: CPT

## 2019-08-24 PROCEDURE — 93320 DOPPLER ECHO COMPLETE: CPT

## 2019-08-24 PROCEDURE — 87205 SMEAR GRAM STAIN: CPT

## 2019-08-24 PROCEDURE — 87070 CULTURE OTHR SPECIMN AEROBIC: CPT

## 2019-08-24 PROCEDURE — 009U3ZX DRAINAGE OF SPINAL CANAL, PERCUTANEOUS APPROACH, DIAGNOSTIC: ICD-10-PCS | Performed by: FAMILY MEDICINE

## 2019-08-24 PROCEDURE — 80048 BASIC METABOLIC PNL TOTAL CA: CPT

## 2019-08-24 PROCEDURE — 93308 TTE F-UP OR LMTD: CPT

## 2019-08-24 PROCEDURE — 84157 ASSAY OF PROTEIN OTHER: CPT

## 2019-08-24 PROCEDURE — 99254 IP/OBS CNSLTJ NEW/EST MOD 60: CPT | Performed by: INTERNAL MEDICINE

## 2019-08-24 PROCEDURE — 2060000000 HC ICU INTERMEDIATE R&B

## 2019-08-24 PROCEDURE — 87390 HIV-1 AG IA: CPT

## 2019-08-24 PROCEDURE — 6360000002 HC RX W HCPCS: Performed by: INTERNAL MEDICINE

## 2019-08-24 RX ORDER — TOPIRAMATE 25 MG/1
50 TABLET ORAL NIGHTLY
Status: DISCONTINUED | OUTPATIENT
Start: 2019-08-24 | End: 2019-08-27 | Stop reason: HOSPADM

## 2019-08-24 RX ORDER — ONDANSETRON 2 MG/ML
4 INJECTION INTRAMUSCULAR; INTRAVENOUS EVERY 6 HOURS PRN
Status: DISCONTINUED | OUTPATIENT
Start: 2019-08-24 | End: 2019-08-27 | Stop reason: HOSPADM

## 2019-08-24 RX ORDER — PANTOPRAZOLE SODIUM 40 MG/1
40 TABLET, DELAYED RELEASE ORAL
Status: DISCONTINUED | OUTPATIENT
Start: 2019-08-24 | End: 2019-08-27 | Stop reason: HOSPADM

## 2019-08-24 RX ORDER — KETOROLAC TROMETHAMINE 15 MG/ML
15 INJECTION, SOLUTION INTRAMUSCULAR; INTRAVENOUS ONCE
Status: COMPLETED | OUTPATIENT
Start: 2019-08-24 | End: 2019-08-24

## 2019-08-24 RX ORDER — LIDOCAINE HYDROCHLORIDE 10 MG/ML
5 INJECTION, SOLUTION EPIDURAL; INFILTRATION; INTRACAUDAL; PERINEURAL ONCE
Status: COMPLETED | OUTPATIENT
Start: 2019-08-24 | End: 2019-08-24

## 2019-08-24 RX ADMIN — BUTALBITAL, ACETAMINOPHEN, AND CAFFEINE 1 TABLET: 50; 325; 40 TABLET ORAL at 20:04

## 2019-08-24 RX ADMIN — ACYCLOVIR SODIUM 490 MG: 50 INJECTION, SOLUTION INTRAVENOUS at 12:54

## 2019-08-24 RX ADMIN — CEFTRIAXONE SODIUM 2 G: 2 INJECTION, POWDER, FOR SOLUTION INTRAMUSCULAR; INTRAVENOUS at 21:34

## 2019-08-24 RX ADMIN — ACYCLOVIR SODIUM 490 MG: 50 INJECTION, SOLUTION INTRAVENOUS at 03:48

## 2019-08-24 RX ADMIN — PANTOPRAZOLE SODIUM 40 MG: 40 TABLET, DELAYED RELEASE ORAL at 09:14

## 2019-08-24 RX ADMIN — PROCHLORPERAZINE EDISYLATE 10 MG: 5 INJECTION INTRAMUSCULAR; INTRAVENOUS at 00:03

## 2019-08-24 RX ADMIN — CEFTRIAXONE SODIUM 2 G: 2 INJECTION, POWDER, FOR SOLUTION INTRAMUSCULAR; INTRAVENOUS at 09:14

## 2019-08-24 RX ADMIN — SODIUM CHLORIDE: 900 INJECTION, SOLUTION INTRAVENOUS at 19:25

## 2019-08-24 RX ADMIN — TOPIRAMATE 50 MG: 25 TABLET, FILM COATED ORAL at 19:55

## 2019-08-24 RX ADMIN — ALPRAZOLAM 0.5 MG: 0.5 TABLET ORAL at 14:51

## 2019-08-24 RX ADMIN — VANCOMYCIN HYDROCHLORIDE 1000 MG: 10 INJECTION, POWDER, LYOPHILIZED, FOR SOLUTION INTRAVENOUS at 22:00

## 2019-08-24 RX ADMIN — COLCHICINE 0.6 MG: 0.6 TABLET, FILM COATED ORAL at 09:16

## 2019-08-24 RX ADMIN — PROCHLORPERAZINE EDISYLATE 10 MG: 5 INJECTION INTRAMUSCULAR; INTRAVENOUS at 19:55

## 2019-08-24 RX ADMIN — ONDANSETRON 4 MG: 2 INJECTION INTRAMUSCULAR; INTRAVENOUS at 12:52

## 2019-08-24 RX ADMIN — INDOMETHACIN 50 MG: 50 CAPSULE ORAL at 09:14

## 2019-08-24 RX ADMIN — METHYLPREDNISOLONE 4 MG: 4 TABLET ORAL at 09:14

## 2019-08-24 RX ADMIN — ACYCLOVIR SODIUM 490 MG: 50 INJECTION, SOLUTION INTRAVENOUS at 19:56

## 2019-08-24 RX ADMIN — KETOROLAC TROMETHAMINE 15 MG: 15 INJECTION, SOLUTION INTRAMUSCULAR; INTRAVENOUS at 14:05

## 2019-08-24 RX ADMIN — VANCOMYCIN HYDROCHLORIDE 1000 MG: 10 INJECTION, POWDER, LYOPHILIZED, FOR SOLUTION INTRAVENOUS at 10:35

## 2019-08-24 RX ADMIN — PANTOPRAZOLE SODIUM 40 MG: 40 TABLET, DELAYED RELEASE ORAL at 18:22

## 2019-08-24 RX ADMIN — MULTIPLE VITAMINS W/ MINERALS TAB 1 TABLET: TAB at 09:16

## 2019-08-24 RX ADMIN — LIDOCAINE HYDROCHLORIDE 5 ML: 10 INJECTION, SOLUTION EPIDURAL; INFILTRATION; INTRACAUDAL; PERINEURAL at 13:29

## 2019-08-24 RX ADMIN — PROCHLORPERAZINE EDISYLATE 10 MG: 5 INJECTION INTRAMUSCULAR; INTRAVENOUS at 09:16

## 2019-08-24 RX ADMIN — PROCHLORPERAZINE EDISYLATE 10 MG: 5 INJECTION INTRAMUSCULAR; INTRAVENOUS at 15:12

## 2019-08-24 RX ADMIN — Medication 10 ML: at 19:55

## 2019-08-24 RX ADMIN — SODIUM CHLORIDE: 900 INJECTION, SOLUTION INTRAVENOUS at 06:55

## 2019-08-24 ASSESSMENT — PAIN SCALES - GENERAL
PAINLEVEL_OUTOF10: 4
PAINLEVEL_OUTOF10: 4
PAINLEVEL_OUTOF10: 0
PAINLEVEL_OUTOF10: 0
PAINLEVEL_OUTOF10: 5
PAINLEVEL_OUTOF10: 10
PAINLEVEL_OUTOF10: 5
PAINLEVEL_OUTOF10: 10

## 2019-08-24 ASSESSMENT — PAIN DESCRIPTION - ONSET
ONSET: ON-GOING

## 2019-08-24 ASSESSMENT — PAIN DESCRIPTION - PAIN TYPE
TYPE: ACUTE PAIN

## 2019-08-24 ASSESSMENT — PAIN - FUNCTIONAL ASSESSMENT
PAIN_FUNCTIONAL_ASSESSMENT: PREVENTS OR INTERFERES SOME ACTIVE ACTIVITIES AND ADLS
PAIN_FUNCTIONAL_ASSESSMENT: PREVENTS OR INTERFERES SOME ACTIVE ACTIVITIES AND ADLS

## 2019-08-24 ASSESSMENT — PAIN DESCRIPTION - ORIENTATION
ORIENTATION: POSTERIOR

## 2019-08-24 ASSESSMENT — PAIN DESCRIPTION - DESCRIPTORS
DESCRIPTORS: OTHER (COMMENT)
DESCRIPTORS: CRUSHING
DESCRIPTORS: OTHER (COMMENT)
DESCRIPTORS: OTHER (COMMENT)

## 2019-08-24 ASSESSMENT — PAIN DESCRIPTION - LOCATION
LOCATION: HEAD
LOCATION: BACK;NECK

## 2019-08-24 ASSESSMENT — PAIN DESCRIPTION - PROGRESSION
CLINICAL_PROGRESSION: NOT CHANGED
CLINICAL_PROGRESSION: GRADUALLY WORSENING

## 2019-08-24 ASSESSMENT — PAIN DESCRIPTION - FREQUENCY
FREQUENCY: CONTINUOUS

## 2019-08-24 NOTE — CONSULTS
Cardiology Consultation                                                                    Pt Name: Jose Santiago  Age: 37 y.o. Sex: female  : 1985  Location: [id number]    Referring Physician: Edith Hyde MD  Primary cardiologist: None      Reason for Consult:     Reason for Consultation/Chief Complaint: Reported history of acute pericarditis    HPI:      Jose Santiago is a 37 y.o. female with no prior cardiac history, recent diagnosis of migraine headaches and acute pericarditis. Patient was directly admitted from her PCPs office yesterday after she presented with recurrent chest pain and headache. Patient was initially seen by her PCP on 2019 with headaches and chest pain and was started on NSAIDs and colchicine however Medrol pack was added few days later due to no significant improvement with her chest pains. Patient describes the chest pains as pleuritic, midsternal, worse with laying supine in bed and improving with leaning forward. The radiate to her back and have been associated with severe headaches. She has found mild improvement with the steroid course. She denies any productive cough. She reports some subjective fevers. ESR and CRP within normal limits this admission. CTA chest negative. Echo 2019: Normal except for small pericardial effusion with no evidence of tamponade. Echo 2019: Unchanged small pericardial effusion with no evidence of tamponade. ECG from  and 2019 are normal, no evidence of ST elevation to suggest acute pericarditis. Histories     Past Medical History:   has a past medical history of Anxiety, Fibrocystic breast disease, History of eating disorder, and Migraine headache. Surgical History:   has a past surgical history that includes Tympanostomy tube placement; Colposcopy; and Salpingo-oophorectomy (Left, 2014). Social History:   reports that she has never smoked.  She has never used smokeless tobacco.     Family History:  No evidence for sudden cardiac death or premature CAD      Medications:       Home Medications  Were reviewed and are listed in nursing record. and/or listed below  Prior to Admission medications    Medication Sig Start Date End Date Taking? Authorizing Provider   omeprazole (PRILOSEC) 20 MG delayed release capsule Take 20 mg by mouth daily   Yes Historical Provider, MD   colchicine (COLCRYS) 0.6 MG tablet Take 0.6 mg by mouth daily   Yes Historical Provider, MD   methylPREDNISolone (MEDROL, IVONE,) 4 MG tablet Take as directed on package insert 8/21/19  Yes Pauline Sevilla MD   lidocaine 4 % external patch Place 1 patch onto the skin daily 8/21/19 9/20/19 Yes Pauline Sevilla MD   indomethacin (INDOCIN) 50 MG capsule Take 1 capsule by mouth 3 times daily 8/20/19  Yes Ruby Tan MD   ALPRAZolam Cindy Caraballo) 0.5 MG tablet Take 1 tablet by mouth 3 times daily as needed for Anxiety for up to 30 days. 8/20/19 9/19/19 Yes Ruby Tan MD   therapeutic multivitamin-minerals [facility]) tablet Take 1 tablet by mouth daily. Yes Historical Provider, MD          Inpatient Medications:   pantoprazole  40 mg Oral BID AC    therapeutic multivitamin-minerals  1 tablet Oral Daily    indomethacin  50 mg Oral TID    colchicine  0.6 mg Oral Daily    lidocaine  1 patch Transdermal Daily    sodium chloride flush  10 mL Intravenous 2 times per day    acyclovir  10 mg/kg Intravenous Q8H    methylPREDNISolone  4 mg Oral Nightly    methylPREDNISolone  4 mg Oral Daily with breakfast    methylPREDNISolone  4 mg Oral Lunch    cefTRIAXone (ROCEPHIN) IV  2 g Intravenous Q12H    vancomycin  1,000 mg Intravenous Q12H       IV drips:   sodium chloride 125 mL/hr at 08/24/19 0655       PRN:  ondansetron, ALPRAZolam, sodium chloride flush, magnesium hydroxide, prochlorperazine, butalbital-acetaminophen-caffeine, perflutren lipid microspheres    Allergy:     Patient has no known allergies.        Review of Systems:     All 12 point review of symptoms completed. Pertinent positives identified in the HPI, all other review of symptoms negative as below. CONSTITUTIONAL: + fatigue  SKIN: No rash or pruritis. EYES: No visual changes or diplopia. No scleral icterus. ENT: No Headaches, hearing loss or vertigo. No mouth sores or sore throat. CARDIOVASCULAR: + chest pain/chest pressure/chest discomfort. No palpitations. No edema. RESPIRATORY: No dyspnea. No cough or wheezing, no sputum production. GASTROINTESTINAL: No N/V/D. No abdominal pain, appetite loss, blood in stools. GENITOURINARY: No dysuria, trouble voiding, or hematuria. MUSCULOSKELETAL:  No gait disturbance, weakness or joint complaints. NEUROLOGICAL: No headache, diplopia, change in muscle strength, numbness or tingling. No change in gait, balance, coordination, mood, affect, memory, mentation, behavior. ENDOCRINE: No excessive thirst, fluid intake, or urination. No tremor. HEMATOLOGIC: No abnormal bruising or bleeding. ALLERGY: No nasal congestion or hives.       Physical Examination:     Vitals:    08/23/19 2358 08/24/19 0349 08/24/19 0905 08/24/19 1520   BP: 102/70 100/64 91/62 98/62   Pulse: 67 69 76 97   Resp: 16 16 18 18   Temp: 98.2 °F (36.8 °C) 97.5 °F (36.4 °C) 97.8 °F (36.6 °C) 98.3 °F (36.8 °C)   TempSrc: Oral Oral Oral Oral   SpO2: 98% 99% 97% 97%   Weight:       Height:           Wt Readings from Last 3 Encounters:   08/23/19 107 lb 11.2 oz (48.9 kg)   08/23/19 110 lb (49.9 kg)   08/21/19 111 lb (50.3 kg)         General Appearance:  Alert, cooperative, no distress, appears stated age Appropriate weight   Head:  Normocephalic, without obvious abnormality, atraumatic   Eyes:  PERRL, conjunctiva/corneas clear EOM intact  Ears normal   Throat no lesions       Nose: Nares normal, no drainage or sinus tenderness   Throat: Lips, mucosa, and tongue normal   Neck: Supple, symmetrical, trachea midline, no adenopathy, thyroid: not enlarged,

## 2019-08-24 NOTE — CONSULTS
NEUROLOGY CONSULT  NOTE :    Reason for Consult: Headaches and meningitis    Chief Complaint : Headaches and meningitis     History of Present Illness:  Ga Clemens is a 37 y.o. female who is being seen in consultation at the request of Dr Saw Santamaria MD for evaluation of headaches with meningitis. History is obtained from the admitting physician's note and also from the patient and her brother and according to them, patient has history of headaches in the past but denies any migrainous features and worsening of the headaches for the past , pressure like sensation and also complaining of neck pain with neck stiffness with nausea, photophobia and phonophobia and also having dizziness and tiredness but denies any blurred vision, double vision, weakness, tingling numbness or balance issues. Medical History:  Past Medical History:   Diagnosis Date    Anxiety 2/19/2015    Fibrocystic breast disease 2/3/2012    History of eating disorder     Migraine headache 2/3/2012     Past Surgical History:   Procedure Laterality Date    COLPOSCOPY      SALPINGO-OOPHORECTOMY Left 2014    TYMPANOSTOMY TUBE PLACEMENT       Medications Prior to Admission: omeprazole (PRILOSEC) 20 MG delayed release capsule, Take 20 mg by mouth daily  colchicine (COLCRYS) 0.6 MG tablet, Take 0.6 mg by mouth daily  methylPREDNISolone (MEDROL, IVONE,) 4 MG tablet, Take as directed on package insert  lidocaine 4 % external patch, Place 1 patch onto the skin daily  indomethacin (INDOCIN) 50 MG capsule, Take 1 capsule by mouth 3 times daily  ALPRAZolam (XANAX) 0.5 MG tablet, Take 1 tablet by mouth 3 times daily as needed for Anxiety for up to 30 days. therapeutic multivitamin-minerals (THERAGRAN-M) tablet, Take 1 tablet by mouth daily.     No Known Allergies  Family History   Problem Relation Age of Onset    Coronary Art Dis Father 43    Hypertension Father      Social History     Tobacco Use   Smoking Status Never Smoker   Smokeless Tobacco Never Used     Social History     Substance and Sexual Activity   Drug Use Not on file     Social History     Substance and Sexual Activity   Alcohol Use Not on file          Current Facility-Administered Medications:     therapeutic multivitamin-minerals 1 tablet, 1 tablet, Oral, Daily, Kumar Johnson MD, 1 tablet at 08/24/19 0916    indomethacin (INDOCIN) capsule 50 mg, 50 mg, Oral, TID, Kumar Johnson MD, 50 mg at 08/24/19 0914    ALPRAZolam (XANAX) tablet 0.5 mg, 0.5 mg, Oral, TID PRN, Kumar Johnson MD, 0.5 mg at 08/23/19 2231    pantoprazole (PROTONIX) tablet 40 mg, 40 mg, Oral, QAM AC, Kumar Johnson MD, 40 mg at 08/24/19 0914    colchicine (COLCRYS) tablet 0.6 mg, 0.6 mg, Oral, Daily, Kumar Johnson MD, 0.6 mg at 08/24/19 0916    lidocaine 4 % external patch 1 patch, 1 patch, Transdermal, Daily, Kumar Johnson MD, 1 patch at 08/23/19 1847    sodium chloride flush 0.9 % injection 10 mL, 10 mL, Intravenous, 2 times per day, Kumar Johnson MD, 10 mL at 08/23/19 2011    sodium chloride flush 0.9 % injection 10 mL, 10 mL, Intravenous, PRN, Kumar Johnson MD, 10 mL at 08/23/19 1848    magnesium hydroxide (MILK OF MAGNESIA) 400 MG/5ML suspension 30 mL, 30 mL, Oral, Daily PRN, Kumar Johnson MD    0.9 % sodium chloride infusion, , Intravenous, Continuous, Kumar Johnson MD, Last Rate: 125 mL/hr at 08/24/19 0655    prochlorperazine (COMPAZINE) injection 10 mg, 10 mg, Intravenous, Q6H PRN, Kumar Johnson MD, 10 mg at 08/24/19 2183    butalbital-acetaminophen-caffeine (FIORICET, ESGIC) per tablet 1 tablet, 1 tablet, Oral, Q4H PRN, Kumar Johnson MD, 1 tablet at 08/23/19 2006    perflutren lipid microspheres (DEFINITY) injection 1.65 mg, 1.5 mL, Intravenous, ONCE PRN, Kumar Johnson MD    acyclovir (ZOVIRAX) 490 mg in dextrose 5 % 100 mL IVPB, 10 mg/kg, Intravenous, Q8H, Kumar Johnson MD, Stopped at 08/24/19 0450    methylPREDNISolone (MEDROL) tablet 4 mg, 4 mg,

## 2019-08-24 NOTE — PLAN OF CARE
Nutrition Problem: Inadequate oral intake  Intervention: Food and/or Nutrient Delivery: Continue NPO(start po diet and ons as able )  Nutritional Goals: pt will tolerate diet adv w/ po intakes of 50% or more of all meals and supplements offered

## 2019-08-24 NOTE — PLAN OF CARE
Problem: Infection:  Goal: Will remain free from infection  Description  Will remain free from infection  Outcome: Ongoing   WBC within defined limits. Afebrile. V.S.S. Monitor for S&S of infection. Antibiotics as ordered. Problem: Pain:  Goal: Patient's pain/discomfort is manageable  Description  Patient's pain/discomfort is manageable  Outcome: Ongoing   Patient C/O headache, neck pain and back pain. See MAR for interventions.

## 2019-08-25 LAB
EKG ATRIAL RATE: 75 BPM
EKG DIAGNOSIS: NORMAL
EKG P AXIS: 71 DEGREES
EKG P-R INTERVAL: 132 MS
EKG Q-T INTERVAL: 388 MS
EKG QRS DURATION: 94 MS
EKG QTC CALCULATION (BAZETT): 433 MS
EKG R AXIS: 97 DEGREES
EKG T AXIS: 89 DEGREES
EKG VENTRICULAR RATE: 75 BPM
HIV AG/AB: NORMAL
HIV ANTIGEN: NORMAL
HIV-1 ANTIBODY: NORMAL
HIV-2 AB: NORMAL
VANCOMYCIN TROUGH: 16 UG/ML (ref 10–20)

## 2019-08-25 PROCEDURE — 93010 ELECTROCARDIOGRAM REPORT: CPT | Performed by: INTERNAL MEDICINE

## 2019-08-25 PROCEDURE — 2060000000 HC ICU INTERMEDIATE R&B

## 2019-08-25 PROCEDURE — 99255 IP/OBS CONSLTJ NEW/EST HI 80: CPT | Performed by: INTERNAL MEDICINE

## 2019-08-25 PROCEDURE — 6370000000 HC RX 637 (ALT 250 FOR IP): Performed by: PSYCHIATRY & NEUROLOGY

## 2019-08-25 PROCEDURE — 6370000000 HC RX 637 (ALT 250 FOR IP): Performed by: INTERNAL MEDICINE

## 2019-08-25 PROCEDURE — 80202 ASSAY OF VANCOMYCIN: CPT

## 2019-08-25 PROCEDURE — 2580000003 HC RX 258: Performed by: INTERNAL MEDICINE

## 2019-08-25 PROCEDURE — 6360000002 HC RX W HCPCS: Performed by: INTERNAL MEDICINE

## 2019-08-25 PROCEDURE — 36415 COLL VENOUS BLD VENIPUNCTURE: CPT

## 2019-08-25 RX ORDER — DIVALPROEX SODIUM 500 MG/1
500 TABLET, EXTENDED RELEASE ORAL EVERY 8 HOURS
Status: DISCONTINUED | OUTPATIENT
Start: 2019-08-25 | End: 2019-08-25

## 2019-08-25 RX ORDER — ALPRAZOLAM 0.5 MG/1
0.5 TABLET ORAL 3 TIMES DAILY PRN
Status: DISCONTINUED | OUTPATIENT
Start: 2019-08-25 | End: 2019-08-27 | Stop reason: HOSPADM

## 2019-08-25 RX ORDER — BUTALBITAL, ACETAMINOPHEN AND CAFFEINE 50; 325; 40 MG/1; MG/1; MG/1
1 TABLET ORAL EVERY 4 HOURS PRN
Status: DISCONTINUED | OUTPATIENT
Start: 2019-08-25 | End: 2019-08-25 | Stop reason: SDUPTHER

## 2019-08-25 RX ORDER — METHYLPREDNISOLONE SODIUM SUCCINATE 125 MG/2ML
250 INJECTION, POWDER, LYOPHILIZED, FOR SOLUTION INTRAMUSCULAR; INTRAVENOUS EVERY 8 HOURS
Status: DISCONTINUED | OUTPATIENT
Start: 2019-08-25 | End: 2019-08-27 | Stop reason: HOSPADM

## 2019-08-25 RX ORDER — PROMETHAZINE HYDROCHLORIDE 12.5 MG/1
12.5 TABLET ORAL EVERY 8 HOURS
Status: COMPLETED | OUTPATIENT
Start: 2019-08-25 | End: 2019-08-26

## 2019-08-25 RX ORDER — DIVALPROEX SODIUM 125 MG/1
500 CAPSULE, COATED PELLETS ORAL 3 TIMES DAILY
Status: COMPLETED | OUTPATIENT
Start: 2019-08-25 | End: 2019-08-25

## 2019-08-25 RX ORDER — CYCLOBENZAPRINE HCL 10 MG
10 TABLET ORAL 3 TIMES DAILY PRN
Status: DISCONTINUED | OUTPATIENT
Start: 2019-08-25 | End: 2019-08-27 | Stop reason: HOSPADM

## 2019-08-25 RX ADMIN — ACYCLOVIR SODIUM 490 MG: 50 INJECTION, SOLUTION INTRAVENOUS at 12:41

## 2019-08-25 RX ADMIN — METHYLPREDNISOLONE 4 MG: 4 TABLET ORAL at 06:11

## 2019-08-25 RX ADMIN — METHYLPREDNISOLONE 4 MG: 4 TABLET ORAL at 00:30

## 2019-08-25 RX ADMIN — METHYLPREDNISOLONE SODIUM SUCCINATE 250 MG: 125 INJECTION, POWDER, FOR SOLUTION INTRAMUSCULAR; INTRAVENOUS at 19:53

## 2019-08-25 RX ADMIN — BUTALBITAL, ACETAMINOPHEN, AND CAFFEINE 1 TABLET: 50; 325; 40 TABLET ORAL at 16:32

## 2019-08-25 RX ADMIN — BUTALBITAL, ACETAMINOPHEN, AND CAFFEINE 1 TABLET: 50; 325; 40 TABLET ORAL at 06:42

## 2019-08-25 RX ADMIN — CEFTRIAXONE SODIUM 2 G: 2 INJECTION, POWDER, FOR SOLUTION INTRAMUSCULAR; INTRAVENOUS at 08:52

## 2019-08-25 RX ADMIN — PROMETHAZINE HYDROCHLORIDE 12.5 MG: 12.5 TABLET ORAL at 18:32

## 2019-08-25 RX ADMIN — METHYLPREDNISOLONE SODIUM SUCCINATE 250 MG: 125 INJECTION, POWDER, FOR SOLUTION INTRAMUSCULAR; INTRAVENOUS at 11:35

## 2019-08-25 RX ADMIN — ACYCLOVIR SODIUM 490 MG: 50 INJECTION, SOLUTION INTRAVENOUS at 04:13

## 2019-08-25 RX ADMIN — PROCHLORPERAZINE EDISYLATE 10 MG: 5 INJECTION INTRAMUSCULAR; INTRAVENOUS at 21:35

## 2019-08-25 RX ADMIN — MULTIPLE VITAMINS W/ MINERALS TAB 1 TABLET: TAB at 08:55

## 2019-08-25 RX ADMIN — SODIUM CHLORIDE: 900 INJECTION, SOLUTION INTRAVENOUS at 08:52

## 2019-08-25 RX ADMIN — DIVALPROEX SODIUM 500 MG: 125 CAPSULE, COATED PELLETS ORAL at 11:35

## 2019-08-25 RX ADMIN — PANTOPRAZOLE SODIUM 40 MG: 40 TABLET, DELAYED RELEASE ORAL at 16:32

## 2019-08-25 RX ADMIN — Medication 10 ML: at 08:52

## 2019-08-25 RX ADMIN — ALPRAZOLAM 0.5 MG: 0.5 TABLET ORAL at 12:57

## 2019-08-25 RX ADMIN — TOPIRAMATE 50 MG: 25 TABLET, FILM COATED ORAL at 21:34

## 2019-08-25 RX ADMIN — BUTALBITAL, ACETAMINOPHEN, AND CAFFEINE 1 TABLET: 50; 325; 40 TABLET ORAL at 10:50

## 2019-08-25 RX ADMIN — DIVALPROEX SODIUM 500 MG: 125 CAPSULE, COATED PELLETS ORAL at 13:58

## 2019-08-25 RX ADMIN — PROMETHAZINE HYDROCHLORIDE 12.5 MG: 12.5 TABLET ORAL at 10:50

## 2019-08-25 RX ADMIN — COLCHICINE 0.6 MG: 0.6 TABLET, FILM COATED ORAL at 08:54

## 2019-08-25 RX ADMIN — BUTALBITAL, ACETAMINOPHEN, AND CAFFEINE 1 TABLET: 50; 325; 40 TABLET ORAL at 21:34

## 2019-08-25 RX ADMIN — SODIUM CHLORIDE: 900 INJECTION, SOLUTION INTRAVENOUS at 18:32

## 2019-08-25 RX ADMIN — ONDANSETRON 4 MG: 2 INJECTION INTRAMUSCULAR; INTRAVENOUS at 16:36

## 2019-08-25 RX ADMIN — PANTOPRAZOLE SODIUM 40 MG: 40 TABLET, DELAYED RELEASE ORAL at 06:11

## 2019-08-25 RX ADMIN — DIVALPROEX SODIUM 500 MG: 125 CAPSULE, COATED PELLETS ORAL at 20:03

## 2019-08-25 RX ADMIN — ALPRAZOLAM 0.5 MG: 0.5 TABLET ORAL at 23:32

## 2019-08-25 ASSESSMENT — PAIN DESCRIPTION - PAIN TYPE
TYPE: ACUTE PAIN

## 2019-08-25 ASSESSMENT — PAIN DESCRIPTION - LOCATION
LOCATION: HEAD
LOCATION: HEAD;NECK

## 2019-08-25 ASSESSMENT — PAIN DESCRIPTION - PROGRESSION
CLINICAL_PROGRESSION: NOT CHANGED
CLINICAL_PROGRESSION: GRADUALLY WORSENING
CLINICAL_PROGRESSION: NOT CHANGED

## 2019-08-25 ASSESSMENT — PAIN SCALES - GENERAL
PAINLEVEL_OUTOF10: 9
PAINLEVEL_OUTOF10: 0
PAINLEVEL_OUTOF10: 0
PAINLEVEL_OUTOF10: 10
PAINLEVEL_OUTOF10: 6
PAINLEVEL_OUTOF10: 10
PAINLEVEL_OUTOF10: 9
PAINLEVEL_OUTOF10: 0
PAINLEVEL_OUTOF10: 0

## 2019-08-25 ASSESSMENT — PAIN DESCRIPTION - FREQUENCY
FREQUENCY: CONTINUOUS

## 2019-08-25 ASSESSMENT — PAIN DESCRIPTION - DESCRIPTORS
DESCRIPTORS: HEADACHE
DESCRIPTORS: HEADACHE
DESCRIPTORS: CONSTANT;HEADACHE
DESCRIPTORS: CONSTANT;DISCOMFORT

## 2019-08-25 ASSESSMENT — PAIN DESCRIPTION - ORIENTATION: ORIENTATION: ANTERIOR

## 2019-08-25 ASSESSMENT — PAIN DESCRIPTION - ONSET
ONSET: ON-GOING

## 2019-08-25 NOTE — CONSULTS
Laterality Date    COLPOSCOPY      SALPINGO-OOPHORECTOMY Left 2014    TYMPANOSTOMY TUBE PLACEMENT           Social History:     · Tobacco use:   reports that she has never smoked. She has never used smokeless tobacco.  · Alcohol use:   has no alcohol history on file. · Currently lives in: [address] [place] [id number]  ·  has no drug history on file. ·  with 3 children      Family History:       Problem Relation Age of Onset    Coronary Art Dis Father 43    Hypertension Father        Medications:    methylPREDNISolone  250 mg Intravenous Q8H    divalproex  500 mg Oral Q8H    promethazine  12.5 mg Oral Q8H    divalproex  500 mg Oral TID    pantoprazole  40 mg Oral BID AC    topiramate  50 mg Oral Nightly    therapeutic multivitamin-minerals  1 tablet Oral Daily    colchicine  0.6 mg Oral Daily    lidocaine  1 patch Transdermal Daily    sodium chloride flush  10 mL Intravenous 2 times per day    acyclovir  10 mg/kg Intravenous Q8H    methylPREDNISolone  4 mg Oral Nightly    methylPREDNISolone  4 mg Oral Daily with breakfast    methylPREDNISolone  4 mg Oral Lunch           REVIEW OF SYSTEMS:       A comprehensive review of systems was negative. Objective:   PHYSICAL EXAM:      Vitals:   Vitals:    08/24/19 1922 08/24/19 2321 08/25/19 0415 08/25/19 0850   BP: 107/70 97/60 (!) 99/58 108/70   Pulse: 95 90 88 88   Resp: 16 18 16 16   Temp: 97.9 °F (36.6 °C) 98.2 °F (36.8 °C) 98.3 °F (36.8 °C) 98.1 °F (36.7 °C)   TempSrc: Oral Oral Oral Oral   SpO2: 97% 97% 95% 98%   Weight:       Height:           General appearance: alert, cooperative, no distress, appears stated age  Eyes: Anicteric  Head: Normocephalic, without obvious abnormality  Lungs: clear to auscultation bilaterally, Normal Effort  Heart: regular rate and rhythm, normal S1 and S2, no murmurs or rubs  Abdomen: soft, non-tender. Bowel sounds normal. No masses,  no organomegaly.    Extremities: atraumatic, no cyanosis or edema  Skin: warm

## 2019-08-26 ENCOUNTER — APPOINTMENT (OUTPATIENT)
Dept: MRI IMAGING | Age: 34
DRG: 316 | End: 2019-08-26
Attending: INTERNAL MEDICINE
Payer: COMMERCIAL

## 2019-08-26 PROCEDURE — 2580000003 HC RX 258: Performed by: FAMILY MEDICINE

## 2019-08-26 PROCEDURE — 6370000000 HC RX 637 (ALT 250 FOR IP): Performed by: INTERNAL MEDICINE

## 2019-08-26 PROCEDURE — 70546 MR ANGIOGRAPH HEAD W/O&W/DYE: CPT

## 2019-08-26 PROCEDURE — A9579 GAD-BASE MR CONTRAST NOS,1ML: HCPCS | Performed by: PSYCHIATRY & NEUROLOGY

## 2019-08-26 PROCEDURE — 6370000000 HC RX 637 (ALT 250 FOR IP): Performed by: FAMILY MEDICINE

## 2019-08-26 PROCEDURE — 6370000000 HC RX 637 (ALT 250 FOR IP): Performed by: PSYCHIATRY & NEUROLOGY

## 2019-08-26 PROCEDURE — 6360000002 HC RX W HCPCS: Performed by: INTERNAL MEDICINE

## 2019-08-26 PROCEDURE — 2060000000 HC ICU INTERMEDIATE R&B

## 2019-08-26 PROCEDURE — 6360000004 HC RX CONTRAST MEDICATION: Performed by: PSYCHIATRY & NEUROLOGY

## 2019-08-26 RX ORDER — 0.9 % SODIUM CHLORIDE 0.9 %
500 INTRAVENOUS SOLUTION INTRAVENOUS ONCE
Status: COMPLETED | OUTPATIENT
Start: 2019-08-26 | End: 2019-08-26

## 2019-08-26 RX ORDER — BUTALBITAL, ACETAMINOPHEN AND CAFFEINE 50; 325; 40 MG/1; MG/1; MG/1
1 TABLET ORAL EVERY 4 HOURS
Status: DISCONTINUED | OUTPATIENT
Start: 2019-08-26 | End: 2019-08-27 | Stop reason: HOSPADM

## 2019-08-26 RX ADMIN — BUTALBITAL, ACETAMINOPHEN, AND CAFFEINE 1 TABLET: 50; 325; 40 TABLET ORAL at 14:14

## 2019-08-26 RX ADMIN — PANTOPRAZOLE SODIUM 40 MG: 40 TABLET, DELAYED RELEASE ORAL at 17:18

## 2019-08-26 RX ADMIN — BUTALBITAL, ACETAMINOPHEN, AND CAFFEINE 1 TABLET: 50; 325; 40 TABLET ORAL at 09:20

## 2019-08-26 RX ADMIN — MULTIPLE VITAMINS W/ MINERALS TAB 1 TABLET: TAB at 08:37

## 2019-08-26 RX ADMIN — BUTALBITAL, ACETAMINOPHEN, AND CAFFEINE 1 TABLET: 50; 325; 40 TABLET ORAL at 21:34

## 2019-08-26 RX ADMIN — METHYLPREDNISOLONE SODIUM SUCCINATE 250 MG: 125 INJECTION, POWDER, FOR SOLUTION INTRAMUSCULAR; INTRAVENOUS at 03:59

## 2019-08-26 RX ADMIN — BUTALBITAL, ACETAMINOPHEN, AND CAFFEINE 1 TABLET: 50; 325; 40 TABLET ORAL at 17:18

## 2019-08-26 RX ADMIN — ALPRAZOLAM 0.5 MG: 0.5 TABLET ORAL at 21:34

## 2019-08-26 RX ADMIN — TOPIRAMATE 50 MG: 25 TABLET, FILM COATED ORAL at 20:15

## 2019-08-26 RX ADMIN — COLCHICINE 0.6 MG: 0.6 TABLET, FILM COATED ORAL at 08:37

## 2019-08-26 RX ADMIN — GADOTERIDOL 10 ML: 279.3 INJECTION, SOLUTION INTRAVENOUS at 13:46

## 2019-08-26 RX ADMIN — PROCHLORPERAZINE EDISYLATE 10 MG: 5 INJECTION INTRAMUSCULAR; INTRAVENOUS at 09:24

## 2019-08-26 RX ADMIN — BUTALBITAL, ACETAMINOPHEN, AND CAFFEINE 1 TABLET: 50; 325; 40 TABLET ORAL at 04:33

## 2019-08-26 RX ADMIN — PANTOPRAZOLE SODIUM 40 MG: 40 TABLET, DELAYED RELEASE ORAL at 06:44

## 2019-08-26 RX ADMIN — PROCHLORPERAZINE EDISYLATE 10 MG: 5 INJECTION INTRAMUSCULAR; INTRAVENOUS at 20:14

## 2019-08-26 RX ADMIN — SODIUM CHLORIDE 500 ML: 9 INJECTION, SOLUTION INTRAVENOUS at 12:12

## 2019-08-26 RX ADMIN — PROMETHAZINE HYDROCHLORIDE 12.5 MG: 12.5 TABLET ORAL at 03:59

## 2019-08-26 ASSESSMENT — PAIN DESCRIPTION - FREQUENCY
FREQUENCY: INTERMITTENT

## 2019-08-26 ASSESSMENT — PAIN DESCRIPTION - DESCRIPTORS
DESCRIPTORS: HEADACHE

## 2019-08-26 ASSESSMENT — PAIN DESCRIPTION - PAIN TYPE
TYPE: ACUTE PAIN

## 2019-08-26 ASSESSMENT — PAIN DESCRIPTION - PROGRESSION
CLINICAL_PROGRESSION: GRADUALLY IMPROVING
CLINICAL_PROGRESSION: GRADUALLY WORSENING
CLINICAL_PROGRESSION: NOT CHANGED

## 2019-08-26 ASSESSMENT — PAIN SCALES - GENERAL
PAINLEVEL_OUTOF10: 0
PAINLEVEL_OUTOF10: 4
PAINLEVEL_OUTOF10: 5
PAINLEVEL_OUTOF10: 6
PAINLEVEL_OUTOF10: 0
PAINLEVEL_OUTOF10: 4
PAINLEVEL_OUTOF10: 6
PAINLEVEL_OUTOF10: 7

## 2019-08-26 ASSESSMENT — PAIN DESCRIPTION - LOCATION
LOCATION: HEAD

## 2019-08-26 ASSESSMENT — PAIN DESCRIPTION - ORIENTATION
ORIENTATION: ANTERIOR

## 2019-08-26 ASSESSMENT — PAIN DESCRIPTION - ONSET
ONSET: ON-GOING
ONSET: GRADUAL

## 2019-08-26 NOTE — CARE COORDINATION
Case Management Assessment           Initial Evaluation                Date / Time of Evaluation: 8/26/2019 11:42 AM                 Assessment Completed by: March[place] Deborah Day    Patient Name: Stephania Gongora     YOB: 1985  Diagnosis: Pericarditis, unspecified chronicity, unspecified type [I31.9]     Date / Time: 8/23/2019  4:49 PM    Patient Admission Status: Inpatient    If patient is discharged prior to next notation, then this note serves as note for discharge by case management. Current PCP: Swetha Hernandez MD  Clinic Patient: No    Chart Reviewed: Yes  Patient/ Family Interviewed: Yes    Initial assessment completed at bedside with: Patient (family also present with permission)     Hospitalization in the last 30 days: No    Emergency Contacts:  Extended Emergency Contact Information  Primary Emergency Contact: Justin King  Address: [address] [place] [address] Phone: [phone]  Relation: Spouse    Advance Directives:   Code Status: [address]: No      Financial:  Payor: Leann Lawrence  / Plan: [employer] / Product Type: *No Product type* /     Pre-cert required for SNF: Yes    Pharmacy:    [address] [phone]  [facility] [address]  Phone: [phone] Fax: [phone]      Potential assistance Purchasing Medications: Potential Assistance Purchasing Medications: No  Does Patient want to participate in local refill/ meds to beds program?: Yes    Meds To Beds General Rules:  1. Can ONLY be done Monday- Friday between 8:30am-5pm  2. Prescription(s) must be in pharmacy by 3pm to be filled same day  3. Copy of patient's insurance/ prescription drug card and patient face sheet must be sent along with the prescription(s)  4. Cost of Rx cannot be added to hospital bill. If financial assistance is needed, please contact unit  or ;  or  CANNOT provide pharmacy voucher for patients co-pays  5. Patients can then  the prescription on their way out of the hospital at discharge, or pharmacy can deliver to the bedside if staff is available. (payment due at time of pick-up or delivery - cash, check, or card accepted)     Able to afford home medications/ co-pay costs: Yes    ADLS:  Support Systems: Children, Spouse/Significant Other    PT AM-PAC:   /24  OT AM-PAC:   /24    Housing:  Home Environment: Home with family  Steps: Yes, no concerns noted for mobility     Plans to RETURN to current housing: Yes  Barrier(s) to RETURNING to current housing: None     Home Care Information:  Currently ACTIVE with [address]: No  Home Care Agency: Not Applicable    Currently ACTIVE with [place] [facility] [place] [facility] A[facility]: No, not eligible     Durable Medical Equipment:  DME Provider: none   Equipment: No DME at baseline     Home Oxygen and Respiratory Equipment:  Has HOME OXYGEN prior to admission: No  Oxygen Company: Not Applicable    Dialysis:  Active with HD/PD prior to admission: No      DISCHARGE PLAN:  Disposition: Home    Transportation PLAN for discharge: family     Factors facilitating achievement of predicted outcomes: Family support, Cooperative, Pleasant and active with primary care. Barriers to discharge: medical complications , medical clearance. Additional Case Management Notes:  SW met with patient and family at bedside. Patient is from home at baseline. Patient is independent with ADL's and uses no DME in the community. Patient follows with a primary care as well to mange overall health. Patient is ready to go home, while family is more apprehensive. Patient would have transportation via family at discharge. Patient pending MRV of brain per Neuro.  ID onboard, no recs for IV ABX at this time. Plan for fluid bolus for post LP headaches vs. Blood Patch. Follow with Neurology outpatient. No further SW needs at this time. SW provided contact information to patient/family and placed information on white board in room should needs arise. No other needs noted at this time. Tawny Charles and her family were provided with choice of provider; she and her family are in agreement with the discharge plan.     Care Transition patient: No    [place] Harm Day, MSW  [facility]  Case Management Department  Ph: [phone]   Fax: [phone]

## 2019-08-27 ENCOUNTER — APPOINTMENT (OUTPATIENT)
Dept: INTERVENTIONAL RADIOLOGY/VASCULAR | Age: 34
DRG: 316 | End: 2019-08-27
Attending: INTERNAL MEDICINE
Payer: COMMERCIAL

## 2019-08-27 VITALS
BODY MASS INDEX: 18.39 KG/M2 | DIASTOLIC BLOOD PRESSURE: 82 MMHG | HEIGHT: 64 IN | RESPIRATION RATE: 18 BRPM | SYSTOLIC BLOOD PRESSURE: 114 MMHG | OXYGEN SATURATION: 99 % | HEART RATE: 89 BPM | TEMPERATURE: 98.6 F | WEIGHT: 107.7 LBS

## 2019-08-27 PROCEDURE — 6370000000 HC RX 637 (ALT 250 FOR IP): Performed by: INTERNAL MEDICINE

## 2019-08-27 PROCEDURE — 6370000000 HC RX 637 (ALT 250 FOR IP): Performed by: FAMILY MEDICINE

## 2019-08-27 PROCEDURE — 99232 SBSQ HOSP IP/OBS MODERATE 35: CPT | Performed by: INTERNAL MEDICINE

## 2019-08-27 PROCEDURE — 2500000003 HC RX 250 WO HCPCS

## 2019-08-27 PROCEDURE — 3E0R3GC INTRODUCTION OF OTHER THERAPEUTIC SUBSTANCE INTO SPINAL CANAL, PERCUTANEOUS APPROACH: ICD-10-PCS | Performed by: RADIOLOGY

## 2019-08-27 PROCEDURE — 6360000004 HC RX CONTRAST MEDICATION: Performed by: RADIOLOGY

## 2019-08-27 PROCEDURE — 77003 FLUOROGUIDE FOR SPINE INJECT: CPT | Performed by: RADIOLOGY

## 2019-08-27 PROCEDURE — 62273 INJECT EPIDURAL PATCH: CPT | Performed by: RADIOLOGY

## 2019-08-27 PROCEDURE — 2709999900 HC NON-CHARGEABLE SUPPLY

## 2019-08-27 PROCEDURE — 2580000003 HC RX 258: Performed by: INTERNAL MEDICINE

## 2019-08-27 RX ORDER — CYCLOBENZAPRINE HCL 5 MG
5 TABLET ORAL 2 TIMES DAILY PRN
Qty: 5 TABLET | Refills: 0 | Status: SHIPPED | OUTPATIENT
Start: 2019-08-27 | End: 2019-09-06

## 2019-08-27 RX ORDER — PROCHLORPERAZINE MALEATE 5 MG/1
5 TABLET ORAL EVERY 8 HOURS PRN
Qty: 6 TABLET | Refills: 0 | Status: ON HOLD | OUTPATIENT
Start: 2019-08-27 | End: 2019-08-30 | Stop reason: SDUPTHER

## 2019-08-27 RX ORDER — COLCHICINE 0.6 MG/1
0.6 TABLET ORAL DAILY
Qty: 30 TABLET | Refills: 0 | Status: SHIPPED | OUTPATIENT
Start: 2019-08-27 | End: 2020-12-22

## 2019-08-27 RX ORDER — TOPIRAMATE 50 MG/1
50 TABLET, FILM COATED ORAL NIGHTLY
Qty: 30 TABLET | Refills: 0 | Status: SHIPPED | OUTPATIENT
Start: 2019-08-27 | End: 2019-09-09 | Stop reason: SDUPTHER

## 2019-08-27 RX ADMIN — BUTALBITAL, ACETAMINOPHEN, AND CAFFEINE 1 TABLET: 50; 325; 40 TABLET ORAL at 08:38

## 2019-08-27 RX ADMIN — MULTIPLE VITAMINS W/ MINERALS TAB 1 TABLET: TAB at 09:38

## 2019-08-27 RX ADMIN — SODIUM CHLORIDE 125 ML/HR: 900 INJECTION, SOLUTION INTRAVENOUS at 09:00

## 2019-08-27 RX ADMIN — SODIUM CHLORIDE: 900 INJECTION, SOLUTION INTRAVENOUS at 00:17

## 2019-08-27 RX ADMIN — BUTALBITAL, ACETAMINOPHEN, AND CAFFEINE 1 TABLET: 50; 325; 40 TABLET ORAL at 04:21

## 2019-08-27 RX ADMIN — IOHEXOL 10 ML: 180 INJECTION INTRAVENOUS at 08:30

## 2019-08-27 RX ADMIN — COLCHICINE 0.6 MG: 0.6 TABLET, FILM COATED ORAL at 09:38

## 2019-08-27 ASSESSMENT — PAIN SCALES - GENERAL
PAINLEVEL_OUTOF10: 5
PAINLEVEL_OUTOF10: 2

## 2019-08-27 ASSESSMENT — PAIN DESCRIPTION - PROGRESSION
CLINICAL_PROGRESSION: NOT CHANGED
CLINICAL_PROGRESSION: NOT CHANGED
CLINICAL_PROGRESSION: GRADUALLY IMPROVING
CLINICAL_PROGRESSION: NOT CHANGED

## 2019-08-27 ASSESSMENT — PAIN DESCRIPTION - ORIENTATION
ORIENTATION: ANTERIOR
ORIENTATION: ANTERIOR

## 2019-08-27 ASSESSMENT — PAIN DESCRIPTION - PAIN TYPE
TYPE: ACUTE PAIN
TYPE: ACUTE PAIN

## 2019-08-27 ASSESSMENT — PAIN DESCRIPTION - DESCRIPTORS
DESCRIPTORS: HEADACHE
DESCRIPTORS: HEADACHE

## 2019-08-27 ASSESSMENT — PAIN DESCRIPTION - FREQUENCY
FREQUENCY: INTERMITTENT
FREQUENCY: INTERMITTENT

## 2019-08-27 ASSESSMENT — PAIN DESCRIPTION - LOCATION
LOCATION: HEAD
LOCATION: HEAD

## 2019-08-27 ASSESSMENT — PAIN DESCRIPTION - ONSET
ONSET: ON-GOING
ONSET: GRADUAL

## 2019-08-27 ASSESSMENT — PAIN - FUNCTIONAL ASSESSMENT: PAIN_FUNCTIONAL_ASSESSMENT: ACTIVITIES ARE NOT PREVENTED

## 2019-08-27 NOTE — PROGRESS NOTES
4 Eyes Admission Assessment     I agree as the admission nurse that 2 RN's have performed a thorough Head to Toe Skin Assessment on the patient. ALL assessment sites listed below have been assessed on admission. Areas assessed by both nurses:  [x]   Head, Face, and Ears   [x]   Shoulders, Back, and Chest  [x]   Arms, Elbows, and Hands   [x]   Coccyx, Sacrum, and Ischum  [x]   Legs, Feet, and Heels        Does the Patient have Skin Breakdown?   No         Isac Prevention initiated:  No   Wound Care Orders initiated:  No      [facility] nurse consulted for Pressure Injury (Stage 3,4, Unstageable, DTI, NWPT, and Complex wounds):  No      Nurse 1 eSignature: Electronically signed by Suhas Brian RN on 8/23/19 at 5:43 PM    **SHARE this note so that the co-signing nurse is able to place an eSignature**    Nurse 2 eSignature: Electronically signed by Ashley Powell RN on 8/23/19 at 2100 PM
HOSPITAL MEDICINE  - PROGRESS NOTE    Admit Date: 8/23/2019         Interval History: 37 y.o. female,hx of Migraines  who presents as a direct admit from her   PCP's office sec to persisting headache  chest pain and neck pain  Pt had initiall seen her PCP on 8/13 with Headaches and CP. + nausea, photophobia and and phonophobia.    - was admitted to the [address] [place] [address] with pericarditis on 8/17.  Hospitalized for 2 days. CTA chest was negative.  Echo showed mild pericardial effusion.  JESSE negative.  CRP < 0.20   CBC normal.     - diagnosed with  Pericarditis and started on ibuprofen and colchicine by Cardiology. [place] better   but deteriorated ? ?and went back to see PCP who referred her to the  hospital.     -severe HA that are more severe than her normal migraines,worse posteriorly. +neck stiffness, gait disturbance or focal weakness. .        Objective: severe nausea ,no abd pain constipation ot vomiting  -relieved temporarily  by antiemetics but re-occurs  Feels very unwell-generalized aches    Diet: Diet NPO Effective Now      Data:   Scheduled Meds: Reviewed  Continuous Infusions:   sodium chloride 125 mL/hr at 08/24/19 0655       Intake/Output Summary (Last 24 hours) at 8/24/2019 1308  Last data filed at 8/24/2019 1302  Gross per 24 hour   Intake 1397.23 ml   Output 1850 ml   Net -452.77 ml     CBC:   Recent Labs     08/23/19  1929   WBC 7.3   HGB 13.0        BMP:  Recent Labs     08/24/19  0511      K 4.0      CO2 26   BUN 8   CREATININE 0.9   GLUCOSE 125*         Objective:   Vitals: BP 91/62   Pulse 76   Temp 97.8 °F (36.6 °C) (Oral)   Resp 18   Ht 5' 4\" (1.626 m)   Wt 107 lb 11.2 oz (48.9 kg)   LMP 08/09/2019 (Approximate)   SpO2 97%   BMI 18.49 kg/m²   General appearance: alert, appears stated age and cooperative  Skin: Skin color, texture, turgor normal.   HEENT: Head: Normocephalic, no lesions, without obvious
HOSPITAL MEDICINE  - PROGRESS NOTE    Admit Date: 8/23/2019         Interval History: 37 y.o. female,hx of Migraines  who presents as a direct admit from her   PCP's office sec to persisting headache  chest pain and neck pain  Pt had initiall seen her PCP on 8/13 with Headaches and CP. + nausea, photophobia and and phonophobia.    - was admitted to the [address] [place] [address] with pericarditis on 8/17.  Hospitalized for 2 days. CTA chest was negative.  Echo showed mild pericardial effusion.  JESSE negative.  CRP < 0.20   CBC normal.     - diagnosed with  Pericarditis and started on ibuprofen and colchicine by Cardiology. [place] better   but deteriorated ? ?and went back to see PCP who referred her to the  hospital.     -severe HA that are more severe than her normal migraines,worse posteriorly. +neck stiffness, gait disturbance or focal weakness. .        Objective: severe nausea  persisting  -does not feel this is related to her headaches. No po intake in several days. no abd pain constipation or vomiting  -relieved temporarily  by antiemetics but re-occurs  Feels very unwell-generalized aches/neck pain and back pain.  +dizziness    Sister at bedside-both insist something really bad is going on.       Diet: DIET GENERAL;      Data:   Scheduled Meds: Reviewed  Continuous Infusions:   sodium chloride 125 mL/hr at 08/25/19 0852       Intake/Output Summary (Last 24 hours) at 8/25/2019 0936  Last data filed at 8/25/2019 0850  Gross per 24 hour   Intake 3850.82 ml   Output 2900 ml   Net 950.82 ml     CBC:   Recent Labs     08/23/19  1929   WBC 7.3   HGB 13.0        BMP:  Recent Labs     08/24/19  0511      K 4.0      CO2 26   BUN 8   CREATININE 0.9   GLUCOSE 125*         Objective:   Vitals: /70   Pulse 88   Temp 98.1 °F (36.7 °C) (Oral)   Resp 16   Ht 5' 4\" (1.626 m)   Wt 107 lb 11.2 oz (48.9 kg)   LMP 08/09/2019 (Approximate)   SpO2 98%
Notified Dr. Nelson Huang, via perfect serve, that pt is here. Awaiting response. Admission assessment is as noted.
Pt off unit to IR for blood patch procedure.
Pt returned to room from IR
Pt returned to room from MRI
Pt to go to MRI, pt requesting to go on stretcher instead of wheelchair because of the severe HA when sitting up. Transport to return with stretcher.     Electronically signed by Tonja Fitzgerald RN on 8/26/2019 at 1:19 PM
There are no further recommendations at this time and hence we will now sign off. Patient may follow up in our clinic once discharged from the hospital for further cardiac care. Thank you for the consult and please do not hesitate to contact me with any questions.      Lucrecia Hanson MD, [facility] [place], [address]  The [address]  [address] [address]  Ph: [phone]  Fax: [phone]
am-depakote/solumedrol/phenergan cocktail q 8hrs  X 3 doses  MRV pending. -IVF bolus then blood patch if not improved.         3. Possible viral meningitis, ruled out  Recent MRI brain negative. IR C/s for LP-CSF findings normal-  Stopped antiviral/antimicrobials. Neuro eval and ID eval     3. Anxiety   cont prn xanax    4. Nausea-compazine,zofran,phenergan as needed  incr PPI to bid  GI eval given persisting symptoms and no po intake for 5 days per pt     Possible d/c tomorrow if improved.        Valeria Roth MD

## 2019-08-27 NOTE — DISCHARGE SUMMARY
Hospital Medicine Discharge Summary    Patient ID: Judy Montenegro      Patient's PCP: Rogelio Bergeron MD    Admit Date: 8/23/2019     Discharge Date:  8.27.19  Admitting Physician: Lenoard Gowers, MD     Discharge Physician: Kris Yang MD     Discharge Diagnoses: Active Hospital Problems    Diagnosis    Pericarditis [I31.9]       The patient was seen and examined on day of discharge and this discharge summary is in conjunction with any daily progress note from day of discharge. Hospital Course: HPI on admission: \"The patient is a 37 y.o. female,hx of Migraines  who presents as a direct admit from her   PCP's office sec to persisting symptoms of Pericardits  Pt had initiall seen her PCP on 8/13 with Headaches and CP. Pt has a hx of  chronic HA ,worse with her menstrual periods. - has had these HA for about 1 year but have increased in freq and intensity. No vision changes, no weakness in bilateral upper extremities, bilateral lower extremities.     + nausea, photophobia and and phonophobia.      Pt has noted some chest pain,not pleuritic or positional,increased malaise, fatigue and nisha dizziness worse today. Had some workup done as outp by PCPt which were all normal. MRI head  was also normal.     Went to the ED  was admitted to the [facility] with pericarditis on 8/17.  Hospitalized for 2 days. CTA chest was negative.  Echo showed mild pericardial effusion.  JESSE negative.  CRP < 0.20   CBC normal.     - diagnosed with  Pericarditis and started on ibuprofen and colchicine by Cardiology. [place] better   but deteriorated ? ?and went back to see PCP who referred her to the  hospital.     -severe HA that are more severe than her normal migraines,worse posteriorly. +neck stiffness, gait disturbance or focal weakness.   - Pain is not better if she leans forward or worse if she leans back.  She has no pain with deep breath, palpitations, dyspnea on exertion, cough, fever.    -no change in bowel habits or black or bloody stools or weight loss.     Has 3 children under the age of 7 at home.  \"  Pt. Was admitted and managed for the followin. Atypical chest pain  Echo -mild pericardial effusion  =troponins negative.  -cardio consulted. -does not appear to be pericarditis        2. Migraine without aura - says HA now worse after Spinal tap  -IVF ,compazine,Imitrex with no improvement; now improved.  -discussed with Neuro this am-depakote/solumedrol/phenergan cocktail q 8hrs  X 3 doses  MRV - no evidence to suggest intracranial venoocclusive disease. -IVF bolus, blood patch done.        3. Possible viral meningitis, ruled out  Recent MRI brain negative. IR C/s for LP-CSF findings normal-  Stopped antiviral/antimicrobials. Neuro eval and ID eval     3. Anxiety   cont prn xanax     4. Nausea-compazine,zofran,phenergan as needed  incr PPI to bid  GI eval given persisting symptoms and no po intake for 5 days per pt     Pt. Improved and was discharged in stable medical condition. Physical Exam Performed:     /82   Pulse 89   Temp 98.6 °F (37 °C) (Oral)   Resp 18   Ht 5' 4\" (1.626 m)   Wt 107 lb 11.2 oz (48.9 kg)   LMP 2019 (Approximate)   SpO2 99%   BMI 18.49 kg/m²       General appearance:  No apparent distress, appears stated age and cooperative. HEENT:  Normal cephalic, atraumatic without obvious deformity. Pupils equal, round, and reactive to light. Extra ocular muscles intact. Conjunctivae/corneas clear. Neck: Supple, with full range of motion. No jugular venous distention. Trachea midline. Respiratory:  Normal respiratory effort. Clear to auscultation, bilaterally without Rales/Wheezes/Rhonchi. Cardiovascular:  Regular rate and rhythm with normal S1/S2 without murmurs, rubs or gallops. Abdomen: Soft, non-tender, non-distended with normal bowel sounds. Musculoskeletal:  No clubbing, cyanosis or edema bilaterally. Full range of motion without deformity.   Skin: Skin color,

## 2019-08-28 ENCOUNTER — TELEPHONE (OUTPATIENT)
Dept: FAMILY MEDICINE CLINIC | Age: 34
End: 2019-08-28

## 2019-08-28 LAB
HERPES SIMPLEX VIRUS BY PCR: NOT DETECTED
HSV SOURCE: NORMAL

## 2019-08-28 NOTE — TELEPHONE ENCOUNTER
Pt called stating she was seen by pcp 8.23  She was released from hospital yesterday 8.27.19  She had a lumbar puncture done 3 days ago and blood patch yesterday. Pt states in middle of night woke up with severe pounding HA. Not as severe while lying down but is not able to walk around without the pounding. Pt states has some pain and stiffness in neck, no back pain and no fever. Is this side effects of lumbar puncture and does pt need to wait it out or does puncture need repeated?     Please advise  Thank you

## 2019-08-29 ENCOUNTER — TELEPHONE (OUTPATIENT)
Dept: FAMILY MEDICINE CLINIC | Age: 34
End: 2019-08-29

## 2019-08-29 DIAGNOSIS — G43.009 MIGRAINE WITHOUT AURA AND WITHOUT STATUS MIGRAINOSUS, NOT INTRACTABLE: Primary | ICD-10-CM

## 2019-08-29 NOTE — TELEPHONE ENCOUNTER
Pt requesting orders be placed to have another blood patch done. She is scheduled for 8/30 at 7am.  Please advise.

## 2019-08-30 ENCOUNTER — TELEPHONE (OUTPATIENT)
Dept: FAMILY MEDICINE CLINIC | Age: 34
End: 2019-08-30

## 2019-08-30 ENCOUNTER — HOSPITAL ENCOUNTER (OUTPATIENT)
Dept: INTERVENTIONAL RADIOLOGY/VASCULAR | Age: 34
Discharge: HOME | End: 2019-08-30
Attending: RADIOLOGY | Admitting: RADIOLOGY
Payer: COMMERCIAL

## 2019-08-30 VITALS — TEMPERATURE: 97.4 F

## 2019-08-30 PROCEDURE — 6360000004 HC RX CONTRAST MEDICATION: Performed by: RADIOLOGY

## 2019-08-30 PROCEDURE — 77003 FLUOROGUIDE FOR SPINE INJECT: CPT | Performed by: RADIOLOGY

## 2019-08-30 PROCEDURE — 62273 INJECT EPIDURAL PATCH: CPT | Performed by: RADIOLOGY

## 2019-08-30 PROCEDURE — 6360000002 HC RX W HCPCS: Performed by: RADIOLOGY

## 2019-08-30 PROCEDURE — 2500000003 HC RX 250 WO HCPCS

## 2019-08-30 RX ORDER — ONDANSETRON 2 MG/ML
4 INJECTION INTRAMUSCULAR; INTRAVENOUS ONCE
Status: COMPLETED | OUTPATIENT
Start: 2019-08-30 | End: 2019-08-30

## 2019-08-30 RX ORDER — PROCHLORPERAZINE MALEATE 5 MG/1
5 TABLET ORAL EVERY 8 HOURS PRN
Qty: 30 TABLET | Refills: 0 | Status: SHIPPED | OUTPATIENT
Start: 2019-08-30 | End: 2019-09-20

## 2019-08-30 RX ADMIN — IOHEXOL 10 ML: 180 INJECTION INTRAVENOUS at 10:20

## 2019-08-30 RX ADMIN — ONDANSETRON 4 MG: 2 INJECTION INTRAMUSCULAR; INTRAVENOUS at 08:23

## 2019-08-30 ASSESSMENT — PAIN DESCRIPTION - ORIENTATION: ORIENTATION: RIGHT

## 2019-08-30 ASSESSMENT — PAIN DESCRIPTION - FREQUENCY: FREQUENCY: INTERMITTENT

## 2019-08-30 ASSESSMENT — PAIN SCALES - GENERAL: PAINLEVEL_OUTOF10: 2

## 2019-08-31 LAB
CSF CULTURE: NORMAL
GRAM STAIN RESULT: NORMAL

## 2019-09-03 ENCOUNTER — NURSE TRIAGE (OUTPATIENT)
Dept: OTHER | Facility: CLINIC | Age: 34
End: 2019-09-03

## 2019-09-03 ENCOUNTER — OFFICE VISIT (OUTPATIENT)
Dept: FAMILY MEDICINE CLINIC | Age: 34
End: 2019-09-03
Payer: COMMERCIAL

## 2019-09-03 VITALS
WEIGHT: 107 LBS | DIASTOLIC BLOOD PRESSURE: 87 MMHG | BODY MASS INDEX: 18.37 KG/M2 | SYSTOLIC BLOOD PRESSURE: 121 MMHG | HEART RATE: 99 BPM | OXYGEN SATURATION: 100 % | TEMPERATURE: 96.1 F | RESPIRATION RATE: 16 BRPM

## 2019-09-03 DIAGNOSIS — J01.00 ACUTE MAXILLARY SINUSITIS, RECURRENCE NOT SPECIFIED: ICD-10-CM

## 2019-09-03 DIAGNOSIS — R11.0 NAUSEA: ICD-10-CM

## 2019-09-03 DIAGNOSIS — G43.009 MIGRAINE WITHOUT AURA AND WITHOUT STATUS MIGRAINOSUS, NOT INTRACTABLE: ICD-10-CM

## 2019-09-03 DIAGNOSIS — I31.39 PERICARDIAL EFFUSION: ICD-10-CM

## 2019-09-03 DIAGNOSIS — M54.50 ACUTE BILATERAL LOW BACK PAIN WITHOUT SCIATICA: ICD-10-CM

## 2019-09-03 DIAGNOSIS — R30.0 DYSURIA: ICD-10-CM

## 2019-09-03 DIAGNOSIS — F41.9 ANXIETY: ICD-10-CM

## 2019-09-03 DIAGNOSIS — M54.9 MID BACK PAIN: ICD-10-CM

## 2019-09-03 DIAGNOSIS — M54.2 NECK PAIN: ICD-10-CM

## 2019-09-03 DIAGNOSIS — G44.209 TENSION HEADACHE: Primary | ICD-10-CM

## 2019-09-03 LAB
BILIRUBIN, POC: NEGATIVE
BLOOD URINE, POC: NEGATIVE
CLARITY, POC: CLEAR
COLOR, POC: YELLOW
GLUCOSE URINE, POC: NEGATIVE
KETONES, POC: NEGATIVE
LEUKOCYTE EST, POC: NEGATIVE
NITRITE, POC: NEGATIVE
PH, POC: 7
PROTEIN, POC: NEGATIVE
SPECIFIC GRAVITY, POC: 1
UROBILINOGEN, POC: 0.2

## 2019-09-03 PROCEDURE — 99214 OFFICE O/P EST MOD 30 MIN: CPT | Performed by: FAMILY MEDICINE

## 2019-09-03 PROCEDURE — 81002 URINALYSIS NONAUTO W/O SCOPE: CPT | Performed by: FAMILY MEDICINE

## 2019-09-03 RX ORDER — SULFAMETHOXAZOLE AND TRIMETHOPRIM 800; 160 MG/1; MG/1
1 TABLET ORAL 2 TIMES DAILY
Qty: 20 TABLET | Refills: 0 | Status: SHIPPED | OUTPATIENT
Start: 2019-09-03 | End: 2019-09-13

## 2019-09-03 RX ORDER — CYCLOBENZAPRINE HCL 10 MG
TABLET ORAL
Qty: 30 TABLET | Refills: 0 | Status: SHIPPED | OUTPATIENT
Start: 2019-09-03 | End: 2020-05-19

## 2019-09-03 RX ORDER — PREDNISONE 20 MG/1
TABLET ORAL
Qty: 14 TABLET | Refills: 0 | Status: SHIPPED | OUTPATIENT
Start: 2019-09-03 | End: 2019-09-20

## 2019-09-03 RX ORDER — FLUOXETINE 10 MG/1
10 CAPSULE ORAL DAILY
Qty: 30 CAPSULE | Refills: 1 | Status: SHIPPED | OUTPATIENT
Start: 2019-09-03 | End: 2019-09-20

## 2019-09-05 LAB — URINE CULTURE, ROUTINE: NORMAL

## 2019-09-09 ENCOUNTER — OFFICE VISIT (OUTPATIENT)
Dept: FAMILY MEDICINE CLINIC | Age: 34
End: 2019-09-09
Payer: COMMERCIAL

## 2019-09-09 VITALS
DIASTOLIC BLOOD PRESSURE: 86 MMHG | SYSTOLIC BLOOD PRESSURE: 132 MMHG | RESPIRATION RATE: 12 BRPM | TEMPERATURE: 98 F | HEART RATE: 120 BPM

## 2019-09-09 DIAGNOSIS — I30.9 ACUTE PERICARDITIS, UNSPECIFIED TYPE: ICD-10-CM

## 2019-09-09 DIAGNOSIS — M26.629 TMJ SYNDROME: Primary | ICD-10-CM

## 2019-09-09 DIAGNOSIS — R42 DIZZINESS: ICD-10-CM

## 2019-09-09 DIAGNOSIS — G43.009 MIGRAINE WITHOUT AURA AND WITHOUT STATUS MIGRAINOSUS, NOT INTRACTABLE: ICD-10-CM

## 2019-09-09 DIAGNOSIS — G44.209 TENSION HEADACHE: ICD-10-CM

## 2019-09-09 DIAGNOSIS — F41.9 ANXIETY: ICD-10-CM

## 2019-09-09 PROCEDURE — 99214 OFFICE O/P EST MOD 30 MIN: CPT | Performed by: FAMILY MEDICINE

## 2019-09-09 PROCEDURE — 96372 THER/PROPH/DIAG INJ SC/IM: CPT | Performed by: FAMILY MEDICINE

## 2019-09-09 RX ORDER — KETOROLAC TROMETHAMINE 30 MG/ML
60 INJECTION, SOLUTION INTRAMUSCULAR; INTRAVENOUS ONCE
Status: COMPLETED | OUTPATIENT
Start: 2019-09-09 | End: 2019-09-09

## 2019-09-09 RX ORDER — TOPIRAMATE 50 MG/1
50 TABLET, FILM COATED ORAL NIGHTLY
Qty: 30 TABLET | Refills: 1 | Status: SHIPPED | OUTPATIENT
Start: 2019-09-09 | End: 2019-10-25 | Stop reason: SDUPTHER

## 2019-09-09 RX ADMIN — KETOROLAC TROMETHAMINE 60 MG: 30 INJECTION, SOLUTION INTRAMUSCULAR; INTRAVENOUS at 16:58

## 2019-09-09 NOTE — PROGRESS NOTES
recommended. - ketorolac (TORADOL) injection 60 mg  - continue Prednisone taper. 2. Tension headache  - continue Prednisone taper.   - ketorolac (TORADOL) injection 60 mg  - Flexeril qhs - 1-2 pills prn. Hold on taking during the day. - Moist heat . ROM exercises. Modify activities. 3. Migraine without aura and without status migrainosus, not intractable  - Continue Topiramate (TOPAMAX) 50 MG tablet; Take 1 tablet by mouth nightly  Dispense: 30 tablet; Refill: 1  - ketorolac (TORADOL) injection 60 mg X 1.    4. Anxiety  - increase Prozac to 20 mg qd ( 2-10 mg initially). 5. Dizziness  - likely multiple etiology. - monitor off Prednisone when completed. 6. Acute pericarditis, unspecified type  - has an appointment with cardiologist tomorrow. Follow up 2-3 weeks/ prn.

## 2019-09-10 ENCOUNTER — OFFICE VISIT (OUTPATIENT)
Dept: CARDIOLOGY CLINIC | Age: 34
End: 2019-09-10
Payer: COMMERCIAL

## 2019-09-10 VITALS
WEIGHT: 107 LBS | BODY MASS INDEX: 18.37 KG/M2 | HEART RATE: 60 BPM | SYSTOLIC BLOOD PRESSURE: 110 MMHG | DIASTOLIC BLOOD PRESSURE: 70 MMHG

## 2019-09-10 DIAGNOSIS — I30.0 ACUTE IDIOPATHIC PERICARDITIS: ICD-10-CM

## 2019-09-10 DIAGNOSIS — I31.39 PERICARDIAL EFFUSION: Primary | ICD-10-CM

## 2019-09-10 PROCEDURE — 99213 OFFICE O/P EST LOW 20 MIN: CPT | Performed by: INTERNAL MEDICINE

## 2019-09-10 RX ORDER — INDOMETHACIN 50 MG/1
50 CAPSULE ORAL 2 TIMES DAILY WITH MEALS
Qty: 28 CAPSULE | Refills: 3 | Status: SHIPPED | OUTPATIENT
Start: 2019-09-10 | End: 2020-05-19

## 2019-09-10 ASSESSMENT — ENCOUNTER SYMPTOMS
CHOKING: 0
COUGH: 0
SHORTNESS OF BREATH: 0
CHEST TIGHTNESS: 0

## 2019-09-17 ENCOUNTER — NURSE TRIAGE (OUTPATIENT)
Dept: OTHER | Facility: CLINIC | Age: 34
End: 2019-09-17

## 2019-09-17 NOTE — TELEPHONE ENCOUNTER
Per Nurse Triage pt needs to be scheduled today. There are no available appts in office. Pt refused overflow office. Can you please contact pt with scheduling information?

## 2019-09-20 ENCOUNTER — OFFICE VISIT (OUTPATIENT)
Dept: FAMILY MEDICINE CLINIC | Age: 34
End: 2019-09-20
Payer: COMMERCIAL

## 2019-09-20 VITALS
BODY MASS INDEX: 18.26 KG/M2 | WEIGHT: 106.4 LBS | RESPIRATION RATE: 12 BRPM | SYSTOLIC BLOOD PRESSURE: 114 MMHG | OXYGEN SATURATION: 99 % | HEART RATE: 108 BPM | DIASTOLIC BLOOD PRESSURE: 84 MMHG

## 2019-09-20 DIAGNOSIS — R42 DIZZINESS: ICD-10-CM

## 2019-09-20 DIAGNOSIS — F41.9 ANXIETY: ICD-10-CM

## 2019-09-20 DIAGNOSIS — M25.50 DIFFUSE ARTHRALGIA: ICD-10-CM

## 2019-09-20 DIAGNOSIS — G43.009 MIGRAINE WITHOUT AURA AND WITHOUT STATUS MIGRAINOSUS, NOT INTRACTABLE: ICD-10-CM

## 2019-09-20 DIAGNOSIS — I30.0 ACUTE IDIOPATHIC PERICARDITIS: ICD-10-CM

## 2019-09-20 DIAGNOSIS — I30.0 ACUTE IDIOPATHIC PERICARDITIS: Primary | ICD-10-CM

## 2019-09-20 LAB
BASOPHILS ABSOLUTE: 0 K/UL (ref 0–0.2)
BASOPHILS RELATIVE PERCENT: 1.1 %
C-REACTIVE PROTEIN: <0.3 MG/L (ref 0–5.1)
EOSINOPHILS ABSOLUTE: 0.1 K/UL (ref 0–0.6)
EOSINOPHILS RELATIVE PERCENT: 1.3 %
HCT VFR BLD CALC: 39.8 % (ref 36–48)
HEMOGLOBIN: 13.4 G/DL (ref 12–16)
LACTATE DEHYDROGENASE: 129 U/L (ref 100–190)
LYMPHOCYTES ABSOLUTE: 1.1 K/UL (ref 1–5.1)
LYMPHOCYTES RELATIVE PERCENT: 24.6 %
MCH RBC QN AUTO: 31.9 PG (ref 26–34)
MCHC RBC AUTO-ENTMCNC: 33.7 G/DL (ref 31–36)
MCV RBC AUTO: 94.6 FL (ref 80–100)
MONOCYTES ABSOLUTE: 0.3 K/UL (ref 0–1.3)
MONOCYTES RELATIVE PERCENT: 7.9 %
NEUTROPHILS ABSOLUTE: 2.8 K/UL (ref 1.7–7.7)
NEUTROPHILS RELATIVE PERCENT: 65.1 %
PDW BLD-RTO: 13.3 % (ref 12.4–15.4)
PLATELET # BLD: 252 K/UL (ref 135–450)
PMV BLD AUTO: 10.7 FL (ref 5–10.5)
RBC # BLD: 4.2 M/UL (ref 4–5.2)
RHEUMATOID FACTOR: <10 IU/ML
SEDIMENTATION RATE, ERYTHROCYTE: 3 MM/HR (ref 0–20)
WBC # BLD: 4.4 K/UL (ref 4–11)

## 2019-09-20 PROCEDURE — 99214 OFFICE O/P EST MOD 30 MIN: CPT | Performed by: FAMILY MEDICINE

## 2019-09-20 RX ORDER — IBUPROFEN 200 MG
200 TABLET ORAL
COMMUNITY
Start: 2019-09-19

## 2019-09-20 NOTE — TELEPHONE ENCOUNTER
Called and spoke with [facility] at the pharmacy is she acknowledges new Rx sent to pharmacy and will process it.

## 2019-09-20 NOTE — PROGRESS NOTES
Here for f/u and recheck of her pericarditis, and persistent dizziness. Pt is not having much of the headache issues, but feels lightheaded. Pt states that she gets intermittent joint pain issues, and some moderate malaise, lethargy. Pt feels that dizziness is all the time. While she was on steroid, it was helpful but when off steroid, continues to feel poorly. Pt was able to function. Pt did see cardiology yesterday and they did f/u bedside echo and had persistent fluid. Not sig enough to cause restriction in heart motion. Treating with incodin and colchicine      Except as noted above in the history of present illness, the review of systems is  negative for headache, vision changes, chest pain, shortness of breath, abdominal pain, urinary sx, bowel changes. Past medical, surgical, and social history reviewed and updated  Medications and allergies reviewed and updated         O: /84   Pulse 108   Resp 12   Wt 106 lb 6.4 oz (48.3 kg)   LMP 09/14/2019   SpO2 99%   Breastfeeding? No   BMI 18.26 kg/m²   GEN: No acute distress, cooperative, well nourished, alert. HEENT: PEERLA, EOMI , normocephalic/atraumatic, nares and oropharynx clear. Mucous membranes normal, Tympanic membranes clear bilaterally. Neck: soft, supple, no thyromegaly, mass, no Lymphadenopathy  CV: Regular rate and rhythm, no murmur, rubs, gallops. No edema. Resp: Clear to auscultation bilaterally good air entry bilaterally  No crackles, wheeze. Breathing comfortably. Psych: frustrated with persistent malaise, lethargy, headaches  Abd: soft, nontender. normoactive bowels sounds. No hepatosplenomegaly  No costovertebral angle tenderness       ASSESSMENT / PLAN:    1.  Acute idiopathic pericarditis  Persistent sx  Did see cardiology, case d/w dr. Mansfield Simmonds today  Cont indocin and colchicine  Check additional bloodwork to r/o other cause, although suspect sx are related to idiopathic cause  Reviewed negative imaging of outside records were reviewed through [facility]. The patient has signed appropriate paperworks/consents.

## 2019-09-20 NOTE — TELEPHONE ENCOUNTER
[facility] called stating they received Gloria's script for Trintellix 5 mg. Her insurance will only pay for one tablet daily, and the current script states to take 2 tablets daily. They would like to know if she is being titrated, or if Dr. Izzy Mcfarland would be ok to change the dose to a 10 mg tablet once daily. Please advise. Thanks.           [phone]

## 2019-09-21 LAB
ANTI-NUCLEAR ANTIBODY (ANA): NEGATIVE
CYCLIC CITRULLINATED PEPTIDE ANTIBODY IGG: <0.5 U/ML (ref 0–2.9)

## 2019-09-24 LAB
CYTOMEGALOVIRUS IGG ANTIBODY: <0.2 U/ML
CYTOMEGALOVIRUS IGM ANTIBODY: <8 AU/ML
LYME, EIA: 0.66 LIV (ref 0–1.2)

## 2019-09-25 LAB
ROCKY MOUNTAIN SPOTTED FEVER AB IGM: ABNORMAL
ROCKY MOUNTAIN SPOTTED FEVER ANTIBODY IGG: ABNORMAL

## 2019-09-26 ENCOUNTER — TELEPHONE (OUTPATIENT)
Dept: FAMILY MEDICINE CLINIC | Age: 34
End: 2019-09-26

## 2019-09-26 DIAGNOSIS — I30.0 ACUTE IDIOPATHIC PERICARDITIS: Primary | ICD-10-CM

## 2019-09-27 ENCOUNTER — HOSPITAL ENCOUNTER (OUTPATIENT)
Dept: CT IMAGING | Age: 34
Discharge: HOME | End: 2019-09-27
Payer: COMMERCIAL

## 2019-09-27 ENCOUNTER — TELEPHONE (OUTPATIENT)
Dept: FAMILY MEDICINE CLINIC | Age: 34
End: 2019-09-27

## 2019-09-27 DIAGNOSIS — M25.50 DIFFUSE ARTHRALGIA: ICD-10-CM

## 2019-09-27 DIAGNOSIS — I30.0 ACUTE IDIOPATHIC PERICARDITIS: ICD-10-CM

## 2019-09-27 LAB
LV EF: 63 %
LVEF MODALITY: NORMAL

## 2019-09-27 PROCEDURE — 6360000004 HC RX CONTRAST MEDICATION: Performed by: FAMILY MEDICINE

## 2019-09-27 PROCEDURE — 74177 CT ABD & PELVIS W/CONTRAST: CPT

## 2019-09-27 RX ORDER — DOXYCYCLINE HYCLATE 100 MG
100 TABLET ORAL 2 TIMES DAILY
Qty: 28 TABLET | Refills: 0 | Status: SHIPPED | OUTPATIENT
Start: 2019-09-27 | End: 2019-10-11

## 2019-09-27 RX ADMIN — IOPAMIDOL 80 ML: 755 INJECTION, SOLUTION INTRAVENOUS at 09:16

## 2019-09-27 RX ADMIN — IOHEXOL 50 ML: 240 INJECTION, SOLUTION INTRATHECAL; INTRAVASCULAR; INTRAVENOUS; ORAL at 09:16

## 2019-10-04 ENCOUNTER — TELEPHONE (OUTPATIENT)
Dept: FAMILY MEDICINE CLINIC | Age: 34
End: 2019-10-04

## 2019-10-04 ENCOUNTER — OFFICE VISIT (OUTPATIENT)
Dept: FAMILY MEDICINE CLINIC | Age: 34
End: 2019-10-04
Payer: COMMERCIAL

## 2019-10-04 VITALS
BODY MASS INDEX: 18.33 KG/M2 | WEIGHT: 106.8 LBS | SYSTOLIC BLOOD PRESSURE: 113 MMHG | OXYGEN SATURATION: 100 % | TEMPERATURE: 98.3 F | DIASTOLIC BLOOD PRESSURE: 76 MMHG | HEART RATE: 110 BPM | RESPIRATION RATE: 18 BRPM

## 2019-10-04 DIAGNOSIS — I30.0 ACUTE IDIOPATHIC PERICARDITIS: Primary | ICD-10-CM

## 2019-10-04 DIAGNOSIS — F41.9 ANXIETY: ICD-10-CM

## 2019-10-04 DIAGNOSIS — M25.50 DIFFUSE ARTHRALGIA: ICD-10-CM

## 2019-10-04 DIAGNOSIS — G43.009 MIGRAINE WITHOUT AURA AND WITHOUT STATUS MIGRAINOSUS, NOT INTRACTABLE: ICD-10-CM

## 2019-10-04 PROCEDURE — 99214 OFFICE O/P EST MOD 30 MIN: CPT | Performed by: FAMILY MEDICINE

## 2019-10-07 ENCOUNTER — TELEPHONE (OUTPATIENT)
Dept: FAMILY MEDICINE CLINIC | Age: 34
End: 2019-10-07

## 2019-10-21 ENCOUNTER — TELEPHONE (OUTPATIENT)
Dept: FAMILY MEDICINE CLINIC | Age: 34
End: 2019-10-21

## 2019-10-25 DIAGNOSIS — G43.009 MIGRAINE WITHOUT AURA AND WITHOUT STATUS MIGRAINOSUS, NOT INTRACTABLE: ICD-10-CM

## 2019-10-25 RX ORDER — TOPIRAMATE 50 MG/1
TABLET, FILM COATED ORAL
Qty: 30 TABLET | Refills: 1 | Status: SHIPPED | OUTPATIENT
Start: 2019-10-25 | End: 2019-11-26 | Stop reason: SDUPTHER

## 2019-10-29 ENCOUNTER — OFFICE VISIT (OUTPATIENT)
Dept: FAMILY MEDICINE CLINIC | Age: 34
End: 2019-10-29
Payer: COMMERCIAL

## 2019-10-29 VITALS
WEIGHT: 107.2 LBS | OXYGEN SATURATION: 97 % | SYSTOLIC BLOOD PRESSURE: 110 MMHG | HEIGHT: 64 IN | BODY MASS INDEX: 18.3 KG/M2 | DIASTOLIC BLOOD PRESSURE: 76 MMHG | HEART RATE: 104 BPM

## 2019-10-29 DIAGNOSIS — M25.50 DIFFUSE ARTHRALGIA: ICD-10-CM

## 2019-10-29 DIAGNOSIS — R07.9 RECURRENT CHEST PAIN: ICD-10-CM

## 2019-10-29 DIAGNOSIS — G43.009 MIGRAINE WITHOUT AURA AND WITHOUT STATUS MIGRAINOSUS, NOT INTRACTABLE: Primary | ICD-10-CM

## 2019-10-29 DIAGNOSIS — F41.9 ANXIETY: ICD-10-CM

## 2019-10-29 DIAGNOSIS — I30.0 ACUTE IDIOPATHIC PERICARDITIS: ICD-10-CM

## 2019-10-29 PROBLEM — I31.9 PERICARDITIS: Status: ACTIVE | Noted: 2019-08-17

## 2019-10-29 PROBLEM — R42 DIZZINESS: Status: ACTIVE | Noted: 2019-10-02

## 2019-10-29 PROBLEM — O09.299 PRIOR PREGNANCY COMPLICATED BY SGA (SMALL FOR GESTATIONAL AGE), ANTEPARTUM: Status: ACTIVE | Noted: 2017-03-24

## 2019-10-29 PROBLEM — M79.10 MYALGIA: Status: ACTIVE | Noted: 2019-09-27

## 2019-10-29 PROBLEM — Z34.80 ENCOUNTER FOR SUPERVISION OF OTHER NORMAL PREGNANCY: Status: ACTIVE | Noted: 2017-01-27

## 2019-10-29 PROBLEM — R87.612 LGSIL OF CERVIX OF UNDETERMINED SIGNIFICANCE: Status: ACTIVE | Noted: 2019-07-02

## 2019-10-29 PROBLEM — R51.9 HEADACHE: Status: ACTIVE | Noted: 2019-09-27

## 2019-10-29 PROCEDURE — 99214 OFFICE O/P EST MOD 30 MIN: CPT | Performed by: FAMILY MEDICINE

## 2019-11-01 ENCOUNTER — TELEPHONE (OUTPATIENT)
Dept: FAMILY MEDICINE CLINIC | Age: 34
End: 2019-11-01

## 2019-11-07 ENCOUNTER — TELEPHONE (OUTPATIENT)
Dept: FAMILY MEDICINE CLINIC | Age: 34
End: 2019-11-07

## 2019-11-08 DIAGNOSIS — I30.0 ACUTE IDIOPATHIC PERICARDITIS: ICD-10-CM

## 2019-11-08 LAB
CORTISOL - AM: 9.7 UG/DL (ref 4.3–22.4)
FOLLICLE STIMULATING HORMONE: 10.8 MIU/ML
LUTEINIZING HORMONE: 4.6 MIU/ML
T4 FREE: 0.9 NG/DL (ref 0.9–1.8)
TSH SERPL DL<=0.05 MIU/L-ACNC: 1.11 UIU/ML (ref 0.27–4.2)

## 2019-11-11 LAB
VMA INTERPRETATION: NORMAL
VMA URINE MG/ML: 1.2 MG/L
VMA, UR/G CRT: 4 MG/GCR (ref 0–6)
VMA-MG/D: NORMAL MG/D (ref 0–7)

## 2019-11-12 ENCOUNTER — OFFICE VISIT (OUTPATIENT)
Dept: FAMILY MEDICINE CLINIC | Age: 34
End: 2019-11-12
Payer: COMMERCIAL

## 2019-11-12 VITALS
HEART RATE: 105 BPM | DIASTOLIC BLOOD PRESSURE: 88 MMHG | RESPIRATION RATE: 12 BRPM | SYSTOLIC BLOOD PRESSURE: 108 MMHG | OXYGEN SATURATION: 99 %

## 2019-11-12 DIAGNOSIS — R30.0 DYSURIA: Primary | ICD-10-CM

## 2019-11-12 DIAGNOSIS — N30.00 ACUTE CYSTITIS WITHOUT HEMATURIA: ICD-10-CM

## 2019-11-12 LAB
BILIRUBIN, POC: ABNORMAL
BLOOD URINE, POC: NEGATIVE
CATE U INT: ABNORMAL
CLARITY, POC: CLEAR
COLOR, POC: YELLOW
CREATININE 24 HOUR URINE: ABNORMAL MG/D (ref 700–1600)
CREATININE URINE: 25.9 MG/DL (ref 28–259)
CREATININE URINE: 30 MG/DL
DOPAMINE (G CRT): 233 UG/G CRT (ref 0–250)
DOPAMINE 24 HOUR URINE: ABNORMAL UG/D (ref 77–324)
DOPAMINE, (UG/L): 70 UG/L
EPINEPHRINE (G CRT): 10 UG/G CRT (ref 0–20)
EPINEPHRINE 24 HOUR URINE: ABNORMAL (ref 1–14)
EPINEPHRINE, (UG/L): 3 UG/L
GLUCOSE URINE, POC: NEGATIVE
HOURS COLLECTED: ABNORMAL HR
KETONES, POC: NEGATIVE
LEUKOCYTE EST, POC: NEGATIVE
MICROALBUMIN UR-MCNC: <1.2 MG/DL
MICROALBUMIN/CREAT UR-RTO: ABNORMAL MG/G (ref 0–30)
NITRITE, POC: POSITIVE
NOREPINEPH (G CRT): 80 UG/G CRT (ref 0–45)
NOREPINEPHRINE 24 HOUR URINE: ABNORMAL UG/D (ref 16–71)
NOREPINEPHRINE, (UG/L): 24 UG/L
PH, POC: 7.5
PROTEIN, POC: NEGATIVE
SPECIFIC GRAVITY, POC: 1
URINE TOTAL VOLUME: ABNORMAL ML
UROBILINOGEN, POC: 0.2

## 2019-11-12 PROCEDURE — 99213 OFFICE O/P EST LOW 20 MIN: CPT | Performed by: FAMILY MEDICINE

## 2019-11-12 PROCEDURE — 81002 URINALYSIS NONAUTO W/O SCOPE: CPT | Performed by: FAMILY MEDICINE

## 2019-11-12 RX ORDER — NITROFURANTOIN 25; 75 MG/1; MG/1
100 CAPSULE ORAL 2 TIMES DAILY
Qty: 14 CAPSULE | Refills: 0 | Status: SHIPPED | OUTPATIENT
Start: 2019-11-12 | End: 2019-11-19

## 2019-11-13 LAB — DHEA: 1.35 NG/ML (ref 1.33–7.78)

## 2019-11-14 LAB — URINE CULTURE, ROUTINE: NORMAL

## 2019-11-18 ENCOUNTER — TELEPHONE (OUTPATIENT)
Dept: FAMILY MEDICINE CLINIC | Age: 34
End: 2019-11-18

## 2019-11-18 DIAGNOSIS — R30.0 DYSURIA: Primary | ICD-10-CM

## 2019-11-20 DIAGNOSIS — R30.0 DYSURIA: ICD-10-CM

## 2019-11-20 LAB
ALBUMIN SERPL-MCNC: 4.5 G/DL (ref 3.5–5.7)
ALBUMIN SERPL-MCNC: 4.5 G/DL (ref 3.5–5.7)
ALP BLD-CCNC: 34 U/L (ref 36–125)
ALT SERPL-CCNC: 15 U/L (ref 7–52)
ANION GAP SERPL CALCULATED.3IONS-SCNC: 8 MMOL/L (ref 3–16)
AST SERPL-CCNC: 18 U/L (ref 13–39)
BILIRUB SERPL-MCNC: 0.4 MG/DL (ref 0–1.5)
BILIRUBIN DIRECT: 0.08 MG/DL (ref 0–0.4)
BILIRUBIN URINE: NEGATIVE
BILIRUBIN, INDIRECT: 0.32 MG/DL (ref 0–1.1)
BLOOD, URINE: NEGATIVE
BUN BLDV-MCNC: 16 MG/DL (ref 7–25)
C-REACTIVE PROTEIN WIDE RANGE: <1 MG/L (ref 1–10)
CALCIUM SERPL-MCNC: 9.8 MG/DL (ref 8.6–10.3)
CHLORIDE BLD-SCNC: 108 MMOL/L (ref 98–110)
CLARITY: ABNORMAL
CO2: 26 MMOL/L (ref 21–33)
COLOR: YELLOW
CREAT SERPL-MCNC: 0.92 MG/DL (ref 0.6–1.3)
EPITHELIAL CELLS, UA: 1 /HPF (ref 0–5)
GFR, ESTIMATED: 80 SEE NOTE.
GFR, ESTIMATED: >90 SEE NOTE.
GLUCOSE BLD-MCNC: 79 MG/DL (ref 70–100)
GLUCOSE URINE: NEGATIVE MG/DL
HCT VFR BLD CALC: 36.4 % (ref 35–45)
HEMOGLOBIN: 11.8 G/DL (ref 11.7–15.5)
HYALINE CASTS: 0 /LPF (ref 0–8)
KETONES, URINE: NEGATIVE MG/DL
LEUKOCYTE ESTERASE, URINE: NEGATIVE
MCH RBC QN AUTO: 30.4 PG (ref 27–33)
MCHC RBC AUTO-ENTMCNC: 32.5 G/DL (ref 32–36)
MCV RBC AUTO: 93.5 FL (ref 80–100)
MICROSCOPIC EXAMINATION: YES
NITRITE, URINE: NEGATIVE
OSMOLALITY CALCULATION: 294 MOSM/KG (ref 278–305)
PDW BLD-RTO: 12.7 % (ref 11–15)
PH UA: 7.5 (ref 5–8)
PHOSPHORUS: 3.6 MG/DL (ref 2.1–4.7)
PLATELET # BLD: 190 10E3/UL (ref 140–400)
PMV BLD AUTO: 10.9 FL (ref 7.5–11.5)
POTASSIUM SERPL-SCNC: 3.8 MMOL/L (ref 3.5–5.3)
PROTEIN UA: NEGATIVE MG/DL
RBC # BLD: 3.89 10E6/UL (ref 3.8–5.1)
RBC UA: 3 /HPF (ref 0–4)
SEDIMENTATION RATE, ERYTHROCYTE: 2 MM/HR (ref 0–20)
SODIUM BLD-SCNC: 142 MMOL/L (ref 133–146)
SPECIFIC GRAVITY UA: 1.02 (ref 1–1.03)
TOTAL PROTEIN: 7 G/DL (ref 6.4–8.9)
URINE TYPE: ABNORMAL
UROBILINOGEN, URINE: 0.2 E.U./DL
WBC # BLD: 4.2 10E3/UL (ref 3.8–10.8)
WBC UA: 1 /HPF (ref 0–5)

## 2019-11-22 LAB — URINE CULTURE, ROUTINE: NORMAL

## 2019-11-26 DIAGNOSIS — G43.009 MIGRAINE WITHOUT AURA AND WITHOUT STATUS MIGRAINOSUS, NOT INTRACTABLE: ICD-10-CM

## 2019-11-26 LAB — GLUCOSE BLD-MCNC: 78 MG/DL (ref 70–100)

## 2019-11-26 RX ORDER — TOPIRAMATE 50 MG/1
TABLET, FILM COATED ORAL
Qty: 30 TABLET | Refills: 1 | Status: SHIPPED | OUTPATIENT
Start: 2019-11-26 | End: 2019-12-26

## 2019-11-27 ENCOUNTER — TELEPHONE (OUTPATIENT)
Dept: FAMILY MEDICINE CLINIC | Age: 34
End: 2019-11-27

## 2020-01-07 DIAGNOSIS — R79.89 ABNORMAL TSH: ICD-10-CM

## 2020-01-07 LAB
T4 FREE: 0.9 NG/DL (ref 0.9–1.8)
THYROID PEROXIDASE (TPO) ABS: 10 IU/ML
TSH SERPL DL<=0.05 MIU/L-ACNC: 1.74 UIU/ML (ref 0.27–4.2)

## 2020-01-20 ENCOUNTER — TELEPHONE (OUTPATIENT)
Dept: FAMILY MEDICINE CLINIC | Age: 35
End: 2020-01-20

## 2020-01-20 RX ORDER — OSELTAMIVIR PHOSPHATE 75 MG/1
75 CAPSULE ORAL DAILY
Qty: 10 CAPSULE | Refills: 0 | Status: SHIPPED | OUTPATIENT
Start: 2020-01-20 | End: 2020-01-30

## 2020-01-20 NOTE — TELEPHONE ENCOUNTER
Pt's child was diagnose with the flu requesting an rx for Tamaflu please advise thank you    [facility] [place] [address] [phone]    Last visit 11/12/2019\no future appts

## 2020-02-04 ENCOUNTER — TELEPHONE (OUTPATIENT)
Dept: FAMILY MEDICINE CLINIC | Age: 35
End: 2020-02-04

## 2020-03-13 NOTE — TELEPHONE ENCOUNTER
Patient requesting a medication refill.   Medication: xanax 0.5 MG tab  [facility] [address]  Last office visit: Carson Urena  Next office visit: Carson Urena

## 2020-03-14 RX ORDER — ALPRAZOLAM 0.5 MG/1
0.5 TABLET ORAL 3 TIMES DAILY PRN
Qty: 30 TABLET | Refills: 0 | OUTPATIENT
Start: 2020-03-14 | End: 2020-04-13

## 2020-03-18 ENCOUNTER — TELEPHONE (OUTPATIENT)
Dept: FAMILY MEDICINE CLINIC | Age: 35
End: 2020-03-18

## 2020-03-18 NOTE — TELEPHONE ENCOUNTER
This is state law, can not refills med w/o seeing her due to state regulations  I can offer her atarax, a non-benzo/non-controlled substance instead if she is interested

## 2020-05-19 ENCOUNTER — VIRTUAL VISIT (OUTPATIENT)
Dept: FAMILY MEDICINE CLINIC | Age: 35
End: 2020-05-19
Payer: COMMERCIAL

## 2020-05-19 VITALS — TEMPERATURE: 97 F | BODY MASS INDEX: 18.71 KG/M2 | WEIGHT: 109 LBS

## 2020-05-19 PROBLEM — M19.90 INFLAMMATORY ARTHRITIS: Status: ACTIVE | Noted: 2020-05-19

## 2020-05-19 PROCEDURE — 99442 PR PHYS/QHP TELEPHONE EVALUATION 11-20 MIN: CPT | Performed by: FAMILY MEDICINE

## 2020-05-19 RX ORDER — TOPIRAMATE 25 MG/1
25 TABLET ORAL NIGHTLY
Qty: 30 TABLET | Refills: 5 | Status: SHIPPED | OUTPATIENT
Start: 2020-05-19 | End: 2020-10-08

## 2020-05-19 RX ORDER — HYDROXYCHLOROQUINE SULFATE 200 MG/1
200 TABLET, FILM COATED ORAL DAILY
COMMUNITY
Start: 2020-02-12 | End: 2020-10-08

## 2020-05-19 RX ORDER — CITALOPRAM 20 MG/1
20 TABLET ORAL DAILY
Qty: 30 TABLET | Refills: 5 | Status: SHIPPED | OUTPATIENT
Start: 2020-05-19 | End: 2020-10-08

## 2020-05-19 NOTE — PROGRESS NOTES
Here for virtual visit and f/u of pericarditis, anxiety. Pt states that overall she is doing ok, since last visit she has been following with cardiology and rheumatology regularly for her pericarditis. Pt did see rheumatology for f/u of inflammation, and was dx with inflammatory arthritis, despite negative bloodwork. Pt started on plaquenil 200mg qd. Pt has question of seronegative RA, but she doesn't know if she 'belives it'. For pericarditis remains on colchicine and kineret. Pt has tried to taper but sx have flared and at this time. Pt is happiest with benefit of kineret and makes her functional.  Pt is going to try and decrease dose of colchicine slowly over the next few weeks. Pt continues with anxiety issues, with some mild to moderate breakthru sx. Pt states that she was on trintellix but stopped d/t cost issues. Pt weaned off and feels that she was doing ok, but feels that at this time she is struggling d/t stressors, anxiety and above health issues, as well as CovID-19 outbreak    Hillary Wright is a 37 y.o. female evaluated via telephone on 5/19/2020. Consent:  She and/or health care decision maker is aware that that she may receive a bill for this telephone service, depending on her insurance coverage, and has provided verbal consent to proceed: Yes      Documentation:  I communicated with the patient and/or health care decision maker about see below  Details of this discussion including any medical advice provided: see below      ASSESSMENT / PLAN:    1. Acute pericarditis, unspecified type  Cont f/u with cardiology  Cont kineret and colchicine. Goal to taper down on colchicine    2. Inflammatory arthritis  Cont f/u with rheumatology  Cont supportive therapy, plaquenil   bloodwork negative, ? Of seronegative RA  Will monitor    3. Anxiety  Moderate persistent anxiety, off trintellix d/t cost  Discussed tx options.    Trial celexa 20mg qd   Discussed use, benefit, and side effects

## 2020-10-08 ENCOUNTER — VIRTUAL VISIT (OUTPATIENT)
Dept: FAMILY MEDICINE CLINIC | Age: 35
End: 2020-10-08

## 2020-10-08 DIAGNOSIS — R79.89 ABNORMAL TSH: ICD-10-CM

## 2020-10-08 LAB
T3 TOTAL: 0.8 NG/ML (ref 0.8–2)
T4 FREE: 1.1 NG/DL (ref 0.9–1.8)
THYROID PEROXIDASE (TPO) ABS: <5 IU/ML
TSH SERPL DL<=0.05 MIU/L-ACNC: 0.78 UIU/ML (ref 0.27–4.2)

## 2020-10-08 PROCEDURE — 99214 OFFICE O/P EST MOD 30 MIN: CPT | Performed by: FAMILY MEDICINE

## 2020-10-08 ASSESSMENT — PATIENT HEALTH QUESTIONNAIRE - PHQ9
SUM OF ALL RESPONSES TO PHQ QUESTIONS 1-9: 0
2. FEELING DOWN, DEPRESSED OR HOPELESS: 0
1. LITTLE INTEREST OR PLEASURE IN DOING THINGS: 0
SUM OF ALL RESPONSES TO PHQ9 QUESTIONS 1 & 2: 0
SUM OF ALL RESPONSES TO PHQ QUESTIONS 1-9: 0

## 2020-10-08 NOTE — PROGRESS NOTES
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 Pt did have bloodwork but did not have any elevation of inflammatory markers. Pt did see rheum for eval here and was seen by rheum at [place], did not feel that she had RA. Pt will f/u with dr. George Haas and will have f/u stress echo testing done in the near future d/t persistent chest pain sx. Has stress echo tomorrow and tilt table test in November to look for POTS    Pt did see pericardial specialist and was told that if she did have pericarditis, it is more related to the pericardial effusion and on MRI, did not have inflammation and did not have abnormal markers. Pt did see ID as well, and they felt that she was exposed to a virus, possibly from a human vs mosquito but that there is no treatment opportunities. Pt is now on oral steroids that does help her symptoms. Pt was taking 30mg a day and tapering down, currently on 10mg. Pt found that when she went from 15 to 10, did have some breakthru sx severity    When they did bw up in [address] [place] [address], through  here and was noted to have a mild decrease in TSH. Remainder of thyroid bloodwork was normal    Pt did suffer miscarriage at 11 weeks with twins. Pt states she suffered miscarriage, as babies did not have HR, and had D+C, and is monitoring serial HCG. Except as noted above in the history of present illness, the review of systems is  negative for headache, vision changes, chest pain, shortness of breath, abdominal pain, urinary sx, bowel changes. Past medical, surgical, and social history reviewed and updated  Medications and allergies reviewed and updated        10/8/2020    TELEHEALTH EVALUATION -- Audio/Visual (During ISQVP-34 public health emergency)      Due to COVID 19 outbreak, patient's office visit was converted to a virtual visit.   Patient was contacted and agreed to proceed with a virtual visit via [employer] Video Visit  The risks and benefits of converting to a virtual visit were discussed in light of the current infectious disease epidemic. Patient also understood that insurance coverage and co-pays are up to their individual insurance plans. As above    Review of Systems  Except as noted above in the history of present illness, the review of systems is  negative for headache, vision changes, chest pain, shortness of breath, abdominal pain, urinary sx, bowel changes. Past medical, surgical, and social history reviewed and updated  Medications and allergies reviewed and updated        Social History     Tobacco Use    Smoking status: Never Smoker    Smokeless tobacco: Never Used   Substance Use Topics    Alcohol use: Not on file    Drug use: Not on file        Current Outpatient Medications   Medication Sig Dispense Refill    ibuprofen (ADVIL;MOTRIN) 200 MG tablet Take 200 mg by mouth      colchicine (COLCRYS) 0.6 MG tablet Take 1 tablet by mouth daily 30 tablet 0    therapeutic multivitamin-minerals (THERAGRAN-M) tablet Take 1 tablet by mouth daily. No current facility-administered medications for this visit. No Known Allergies     PHYSICAL EXAMINATION:    All boxes checked are findings on exam, empty boxes are negative or not evaluated during the examination  Limitation in exam due to use of video conferencing software, virtual visits    Vital Signs: (As obtained by patient/caregiver or practitioner observation)  There were no vitals taken for this visit. Constitutional: [x] Appears well-developed and well-nourished [x] No apparent distress      [] Abnormal-   Mental status  [x] Alert and awake  [x] Oriented to person/place/time []Able to follow commands      Eyes:  EOM    []  Normal  [] Abnormal-  Sclera  []  Normal  [] Abnormal -         Discharge []  None visible  [] Abnormal -    HENT:   [x] Normocephalic, atraumatic.   [] were reviewed through [place]. The patient has signed appropriate paperworks/consents. An  electronic signature was used to authenticate this note. --Jennifer Morales MD on 10/8/2020 at 10:36 AM      Pursuant to the emergency declaration under the [address] [place] [address], [facility] waiver authority and the [employer] Act, this Virtual  Visit was conducted, with patient's consent, to reduce the patient's risk of exposure to COVID-19 and provide continuity of care for an established patient. Services were provided through a video synchronous discussion virtually to substitute for in-person clinic visit.

## 2020-11-24 ENCOUNTER — OFFICE VISIT (OUTPATIENT)
Dept: FAMILY MEDICINE CLINIC | Age: 35
End: 2020-11-24

## 2020-11-24 VITALS
HEART RATE: 77 BPM | RESPIRATION RATE: 12 BRPM | BODY MASS INDEX: 20.32 KG/M2 | DIASTOLIC BLOOD PRESSURE: 77 MMHG | TEMPERATURE: 97.3 F | WEIGHT: 119 LBS | HEIGHT: 64 IN | OXYGEN SATURATION: 99 % | SYSTOLIC BLOOD PRESSURE: 102 MMHG

## 2020-11-24 PROCEDURE — 99214 OFFICE O/P EST MOD 30 MIN: CPT | Performed by: FAMILY MEDICINE

## 2020-11-24 RX ORDER — SUMATRIPTAN 100 MG/1
100 TABLET, FILM COATED ORAL
Qty: 9 TABLET | Refills: 5 | Status: SHIPPED | OUTPATIENT
Start: 2020-11-24 | End: 2021-12-14

## 2020-11-24 NOTE — PROGRESS NOTES
Here for f/u, pt states that overall from the standpoint of her pericarditis she is feeling so much better. Pt was on steroid prior and tapered off slowly. Now that she has been off the steroid, does get a flare of her migraines, associated with some dizziness. Usually doesn't have together and feels that sx are related to her migraines. Pt can get some intermittent hot flashes at times and finds that there are some weird symptoms. Pt did see ID for her pericarditis issues and they felt that it was some sort of post-viral syndrome after travel. She does feel sig improved from prior. Pt does want to have another child and is concerned about her prior symptoms / issues . Pt did see rheumatology yesterday and they are happy with the improvement in her symptoms. She is working out reguarly now and feels that at this time is not worried about long-term immune/inflammation for now but will monitor accordingly. Pt uses motrin for her migraines. Pt also has imitrex and it does feel that when she takes, it does well for her. Pt did discuss with neurology and considered amitripytyline but held off on starting d/t desire to get pregnant      Except as noted above in the history of present illness, the review of systems is  negative for headache, vision changes, chest pain, shortness of breath, abdominal pain, urinary sx, bowel changes. Past medical, surgical, and social history reviewed and updated  Medications and allergies reviewed and updated         O: /77   Pulse 77   Temp 97.3 °F (36.3 °C) (Temporal)   Resp 12   Ht 5' 4\" (1.626 m)   Wt 119 lb (54 kg)   LMP 11/12/2020 (Approximate)   SpO2 99%   Breastfeeding No   BMI 20.43 kg/m²   GEN: No acute distress, cooperative, well nourished, alert. HEENT: PEERLA, EOMI , normocephalic/atraumatic, nares and oropharynx clear. Mucous membranes normal, Tympanic membranes clear bilaterally.     Neck: soft, supple, no thyromegaly, mass, no

## 2020-11-25 DIAGNOSIS — R53.83 FATIGUE, UNSPECIFIED TYPE: ICD-10-CM

## 2020-11-25 DIAGNOSIS — R79.89 ABNORMAL TSH: ICD-10-CM

## 2020-11-25 LAB
A/G RATIO: 1.9 (ref 1.1–2.2)
ALBUMIN SERPL-MCNC: 4.3 G/DL (ref 3.4–5)
ALP BLD-CCNC: 48 U/L (ref 40–129)
ALT SERPL-CCNC: 23 U/L (ref 10–40)
ANION GAP SERPL CALCULATED.3IONS-SCNC: 8 MMOL/L (ref 3–16)
AST SERPL-CCNC: 27 U/L (ref 15–37)
BILIRUB SERPL-MCNC: 0.6 MG/DL (ref 0–1)
BUN BLDV-MCNC: 11 MG/DL (ref 7–20)
CALCIUM SERPL-MCNC: 9.7 MG/DL (ref 8.3–10.6)
CHLORIDE BLD-SCNC: 105 MMOL/L (ref 99–110)
CO2: 27 MMOL/L (ref 21–32)
CORTISOL - AM: 9.2 UG/DL (ref 4.3–22.4)
CREAT SERPL-MCNC: 0.7 MG/DL (ref 0.6–1.1)
FOLLICLE STIMULATING HORMONE: 7.1 MIU/ML
GFR AFRICAN AMERICAN: >60
GFR NON-AFRICAN AMERICAN: >60
GLOBULIN: 2.3 G/DL
GLUCOSE BLD-MCNC: 86 MG/DL (ref 70–99)
HCT VFR BLD CALC: 39.8 % (ref 36–48)
HEMOGLOBIN: 13.1 G/DL (ref 12–16)
LUTEINIZING HORMONE: 9 MIU/ML
MCH RBC QN AUTO: 30.2 PG (ref 26–34)
MCHC RBC AUTO-ENTMCNC: 32.9 G/DL (ref 31–36)
MCV RBC AUTO: 91.9 FL (ref 80–100)
PDW BLD-RTO: 13.7 % (ref 12.4–15.4)
PLATELET # BLD: 192 K/UL (ref 135–450)
PLATELET SLIDE REVIEW: ADEQUATE
PMV BLD AUTO: 11.5 FL (ref 5–10.5)
POTASSIUM SERPL-SCNC: 4.3 MMOL/L (ref 3.5–5.1)
PROLACTIN: 15.2 NG/ML
RBC # BLD: 4.34 M/UL (ref 4–5.2)
SODIUM BLD-SCNC: 140 MMOL/L (ref 136–145)
T3 TOTAL: 0.91 NG/ML (ref 0.8–2)
T4 FREE: 0.9 NG/DL (ref 0.9–1.8)
THYROID PEROXIDASE (TPO) ABS: 7 IU/ML
TOTAL PROTEIN: 6.6 G/DL (ref 6.4–8.2)
TSH SERPL DL<=0.05 MIU/L-ACNC: 1.2 UIU/ML (ref 0.27–4.2)
WBC # BLD: 4.1 K/UL (ref 4–11)

## 2020-11-26 LAB
ESTIMATED AVERAGE GLUCOSE: 99.7 MG/DL
HBA1C MFR BLD: 5.1 %

## 2020-12-16 ENCOUNTER — PATIENT MESSAGE (OUTPATIENT)
Dept: FAMILY MEDICINE CLINIC | Age: 35
End: 2020-12-16
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
## 2020-12-22 ENCOUNTER — OFFICE VISIT (OUTPATIENT)
Dept: FAMILY MEDICINE CLINIC | Age: 35
End: 2020-12-22

## 2020-12-22 VITALS
HEART RATE: 85 BPM | RESPIRATION RATE: 16 BRPM | BODY MASS INDEX: 20.6 KG/M2 | OXYGEN SATURATION: 99 % | DIASTOLIC BLOOD PRESSURE: 62 MMHG | TEMPERATURE: 97.3 F | WEIGHT: 120 LBS | SYSTOLIC BLOOD PRESSURE: 100 MMHG

## 2020-12-22 PROCEDURE — 99214 OFFICE O/P EST MOD 30 MIN: CPT | Performed by: FAMILY MEDICINE

## 2020-12-22 RX ORDER — RIZATRIPTAN BENZOATE 10 MG/1
10 TABLET ORAL DAILY PRN
Qty: 9 TABLET | Refills: 5 | Status: SHIPPED | OUTPATIENT
Start: 2020-12-22 | End: 2021-12-14

## 2020-12-22 RX ORDER — HYDROXYCHLOROQUINE SULFATE 200 MG/1
200 TABLET, FILM COATED ORAL DAILY
COMMUNITY
End: 2021-12-14

## 2020-12-22 NOTE — PROGRESS NOTES
Here for f/u and recheck from prior visit where she was able to taper off the steroids for her chronic / idiopathic pericarditis. Pt states that off the steroids she was feeling better but then again started with some fatigue and flare of HA. Pt thought that sx could be related to some irregularity to her cycles, although did have miscarriage a few months ago followed by a chemical pregnancy. Pt was noticing that she feels that the HA may be worse related to her menstrual cycles. Pt has noted some irregular spotting in between her cycles which is not common for her. Pt has had some pelvic discomfort. Pt did have last pap that was normal, but had HPV + and they were recommending a colposcopy but that couldn't be done due to her being pregnant at the time. Pt did have D+C and did have pelvic ultrasound that was done 1-2 weeks after the D+C that looked ok. Pt is set for colposcopy in 2 weeks    Except as noted above in the history of present illness, the review of systems is  negative for headache, vision changes, chest pain, shortness of breath, abdominal pain, urinary sx, bowel changes. Past medical, surgical, and social history reviewed and updated  Medications and allergies reviewed and updated         O: /62   Pulse 85   Temp 97.3 °F (36.3 °C) (Temporal)   Resp 16   Wt 120 lb (54.4 kg)   LMP 12/14/2020 (Exact Date)   SpO2 99%   BMI 20.60 kg/m²   GEN: No acute distress, cooperative, well nourished, alert. HEENT: PEERLA, EOMI , normocephalic/atraumatic, nares and oropharynx clear. Mucous membranes normal, Tympanic membranes clear bilaterally. Neck: soft, supple, no thyromegaly, mass, no Lymphadenopathy  CV: Regular rate and rhythm, no murmur, rubs, gallops. No edema. Resp: Clear to auscultation bilaterally good air entry bilaterally  No crackles, wheeze. Breathing comfortably. Psych: mood stable      ASSESSMENT / PLAN:    1.  Menorrhagia with irregular cycle  Suspect related to

## 2020-12-28 ENCOUNTER — HOSPITAL ENCOUNTER (OUTPATIENT)
Dept: ULTRASOUND IMAGING | Age: 35
Discharge: HOME | End: 2020-12-28
Payer: COMMERCIAL

## 2020-12-28 PROCEDURE — 76856 US EXAM PELVIC COMPLETE: CPT

## 2020-12-28 PROCEDURE — 76830 TRANSVAGINAL US NON-OB: CPT

## 2021-04-23 ENCOUNTER — IMMUNIZATION (OUTPATIENT)
Dept: FAMILY MEDICINE CLINIC | Age: 36
End: 2021-04-23
Payer: COMMERCIAL

## 2021-04-23 PROCEDURE — 91300 COVID-19, PFIZER VACCINE 30MCG/0.3ML DOSE: CPT | Performed by: NURSE PRACTITIONER

## 2021-04-23 PROCEDURE — 0001A COVID-19, PFIZER VACCINE 30MCG/0.3ML DOSE: CPT | Performed by: NURSE PRACTITIONER

## 2021-05-14 ENCOUNTER — IMMUNIZATION (OUTPATIENT)
Dept: FAMILY MEDICINE CLINIC | Age: 36
End: 2021-05-14
Payer: COMMERCIAL

## 2021-05-14 PROCEDURE — 91300 COVID-19, PFIZER VACCINE 30MCG/0.3ML DOSE: CPT | Performed by: FAMILY MEDICINE

## 2021-05-14 PROCEDURE — 0002A COVID-19, PFIZER VACCINE 30MCG/0.3ML DOSE: CPT | Performed by: FAMILY MEDICINE

## 2021-07-19 ENCOUNTER — OFFICE VISIT (OUTPATIENT)
Dept: FAMILY MEDICINE CLINIC | Age: 36
End: 2021-07-19
Payer: COMMERCIAL

## 2021-07-19 VITALS
BODY MASS INDEX: 23 KG/M2 | DIASTOLIC BLOOD PRESSURE: 60 MMHG | WEIGHT: 134 LBS | HEART RATE: 88 BPM | OXYGEN SATURATION: 99 % | SYSTOLIC BLOOD PRESSURE: 102 MMHG | TEMPERATURE: 97.1 F | RESPIRATION RATE: 14 BRPM

## 2021-07-19 DIAGNOSIS — J01.00 ACUTE NON-RECURRENT MAXILLARY SINUSITIS: Primary | ICD-10-CM

## 2021-07-19 PROCEDURE — 1036F TOBACCO NON-USER: CPT | Performed by: FAMILY MEDICINE

## 2021-07-19 PROCEDURE — G8427 DOCREV CUR MEDS BY ELIG CLIN: HCPCS | Performed by: FAMILY MEDICINE

## 2021-07-19 PROCEDURE — G8420 CALC BMI NORM PARAMETERS: HCPCS | Performed by: FAMILY MEDICINE

## 2021-07-19 PROCEDURE — 99213 OFFICE O/P EST LOW 20 MIN: CPT | Performed by: FAMILY MEDICINE

## 2021-07-19 RX ORDER — AMOXICILLIN 875 MG/1
875 TABLET, COATED ORAL 2 TIMES DAILY
Qty: 20 TABLET | Refills: 0 | Status: SHIPPED | OUTPATIENT
Start: 2021-07-19 | End: 2021-07-29

## 2021-07-19 ASSESSMENT — PATIENT HEALTH QUESTIONNAIRE - PHQ9
1. LITTLE INTEREST OR PLEASURE IN DOING THINGS: 0
SUM OF ALL RESPONSES TO PHQ QUESTIONS 1-9: 0
2. FEELING DOWN, DEPRESSED OR HOPELESS: 0
SUM OF ALL RESPONSES TO PHQ9 QUESTIONS 1 & 2: 0

## 2021-07-19 NOTE — PROGRESS NOTES
Subjective:      Patient ID: Xena Summers 43 y.o. female. is here for evaluation for sinus issue, balance off      HPI    Several years ago had serious of health issues; was ultimately diagnosed with seronegative RA and fibromyalgia. At that time had dizziness with HA. She has never been sure if the dizziness is RA or fibromyalgia. Symptoms are better when on higher dose steroids. She saw ENT when this first started. Was noted to have unilateral hearing loss. Did not think she had Meniere's. Is currently pregnant. Had morning sickness; has resolved is 26 weeks. She complains of recurrent dizziness and HA  - worse in the past few months. At the same time she notes increasing sinus congestion, green and bloody nasal discharge, pain in the cheeks and at the side of the nose. No fever, cough. On and off ears are full and tender. Notes feeling of dizziness, not always with movement. Can occur if moves her head quickly   Feels like she just got off a boat. Is constant with mild nausea the past few weeks. Is not present in the AM, gets worse as the day goes. Rx: Excedrin Migraine is effective for HA.        Outpatient Medications Marked as Taking for the 21 encounter (Office Visit) with Antione Horn MD   Medication Sig Dispense Refill    Prenatal Multivit-Min-Fe-FA (PRE- PO) Take by mouth      certolizumab pegol (CIMZIA) 2 X 200 MG/ML KIT injection Inject 200 mg into the skin every 14 days      hydroxychloroquine (PLAQUENIL) 200 MG tablet Take 200 mg by mouth daily          No Known Allergies    Patient Active Problem List   Diagnosis    Fibrocystic disease of breast    Migraine    Hx of ectopic pregnancy    Attention deficit hyperactivity disorder, predominantly inattentive type    Anxiety    Pericarditis    Myalgia    LGSIL of cervix of undetermined significance    Left-sided chest pain    Headache    Encounter for supervision of other normal pregnancy    Dizziness    Anorexia    Abnormal Pap smear of cervix    Prior pregnancy complicated by SGA (small for gestational age), antepartum    Small for gestational age   [de-identified] Inflammatory arthritis       Past Medical History:   Diagnosis Date    Anxiety 2/19/2015    Fibrocystic breast disease 2/3/2012    History of eating disorder     Migraine headache 2/3/2012       Past Surgical History:   Procedure Laterality Date    COLPOSCOPY      DILATION AND CURETTAGE OF UTERUS  08/2020    d/t miscarriage    SALPINGO-OOPHORECTOMY Left 2014    TYMPANOSTOMY TUBE PLACEMENT          Family History   Problem Relation Age of Onset    Coronary Art Dis Father 43    Hypertension Father        Social History     Tobacco Use    Smoking status: Never Smoker    Smokeless tobacco: Never Used   Substance Use Topics    Alcohol use: Not on file    Drug use: Not on file            Review of Systems  Review of Systems    Objective:   Physical Exam  Vitals:    07/19/21 1042   BP: 102/60   Pulse: 88   Resp: 14   Temp: 97.1 °F (36.2 °C)   TempSrc: Temporal   SpO2: 99%   Weight: 134 lb (60.8 kg)       Physical Exam  NAD    Skin is warm and dry. Well hydrated, no rash. No respiratory distress. Ear exam - bilateral normal, TM intact without perforation or effusion, external canal normal. No significant ceruminosis noted. . Nares boggy with mucoid discharge. Moderate maxillary sinus tenderness. Pharynx normal, no oral lesions. Shotty cervical LNS, neg submandibular and supraclavicular LNS. Chest is clear, no wheezing or rales. Normal symmetric air entry throughout both lung fields. Cardiac regular w/o murmer, gallop. Assessment:       Diagnosis Orders   1. Acute non-recurrent maxillary sinusitis  amoxicillin (AMOXIL) 875 MG tablet          Plan:      Side effects of current medications reviewed and questions answered. Call or return to clinic prn if these symptoms worsen or fail to improve as anticipated.

## 2021-12-14 ENCOUNTER — OFFICE VISIT (OUTPATIENT)
Dept: FAMILY MEDICINE CLINIC | Age: 36
End: 2021-12-14
Payer: COMMERCIAL

## 2021-12-14 VITALS
SYSTOLIC BLOOD PRESSURE: 100 MMHG | WEIGHT: 124.2 LBS | DIASTOLIC BLOOD PRESSURE: 70 MMHG | BODY MASS INDEX: 21.21 KG/M2 | TEMPERATURE: 97.3 F | HEART RATE: 80 BPM | OXYGEN SATURATION: 99 % | RESPIRATION RATE: 12 BRPM | HEIGHT: 64 IN

## 2021-12-14 DIAGNOSIS — F41.9 ANXIETY: ICD-10-CM

## 2021-12-14 DIAGNOSIS — M06.09 RHEUMATOID ARTHRITIS OF MULTIPLE SITES WITH NEGATIVE RHEUMATOID FACTOR (HCC): ICD-10-CM

## 2021-12-14 DIAGNOSIS — M19.90 INFLAMMATORY ARTHRITIS: Primary | ICD-10-CM

## 2021-12-14 PROCEDURE — G8420 CALC BMI NORM PARAMETERS: HCPCS | Performed by: FAMILY MEDICINE

## 2021-12-14 PROCEDURE — G8484 FLU IMMUNIZE NO ADMIN: HCPCS | Performed by: FAMILY MEDICINE

## 2021-12-14 PROCEDURE — 99214 OFFICE O/P EST MOD 30 MIN: CPT | Performed by: FAMILY MEDICINE

## 2021-12-14 PROCEDURE — 1036F TOBACCO NON-USER: CPT | Performed by: FAMILY MEDICINE

## 2021-12-14 PROCEDURE — G8427 DOCREV CUR MEDS BY ELIG CLIN: HCPCS | Performed by: FAMILY MEDICINE

## 2021-12-14 RX ORDER — PREDNISONE 1 MG/1
10 TABLET ORAL DAILY
COMMUNITY
Start: 2021-07-12 | End: 2022-07-25

## 2021-12-14 RX ORDER — PAROXETINE 10 MG/1
10 TABLET, FILM COATED ORAL DAILY
Qty: 30 TABLET | Refills: 3 | Status: SHIPPED | OUTPATIENT
Start: 2021-12-14 | End: 2022-07-25 | Stop reason: SDUPTHER

## 2021-12-14 SDOH — ECONOMIC STABILITY: FOOD INSECURITY: WITHIN THE PAST 12 MONTHS, YOU WORRIED THAT YOUR FOOD WOULD RUN OUT BEFORE YOU GOT MONEY TO BUY MORE.: NEVER TRUE

## 2021-12-14 SDOH — ECONOMIC STABILITY: FOOD INSECURITY: WITHIN THE PAST 12 MONTHS, THE FOOD YOU BOUGHT JUST DIDN'T LAST AND YOU DIDN'T HAVE MONEY TO GET MORE.: NEVER TRUE

## 2021-12-14 SDOH — ECONOMIC STABILITY: TRANSPORTATION INSECURITY
IN THE PAST 12 MONTHS, HAS LACK OF TRANSPORTATION KEPT YOU FROM MEETINGS, WORK, OR FROM GETTING THINGS NEEDED FOR DAILY LIVING?: NO

## 2021-12-14 SDOH — ECONOMIC STABILITY: TRANSPORTATION INSECURITY
IN THE PAST 12 MONTHS, HAS THE LACK OF TRANSPORTATION KEPT YOU FROM MEDICAL APPOINTMENTS OR FROM GETTING MEDICATIONS?: NO

## 2021-12-14 ASSESSMENT — SOCIAL DETERMINANTS OF HEALTH (SDOH): HOW HARD IS IT FOR YOU TO PAY FOR THE VERY BASICS LIKE FOOD, HOUSING, MEDICAL CARE, AND HEATING?: NOT HARD AT ALL

## 2021-12-14 NOTE — PROGRESS NOTES
of multiple sites with negative rheumatoid factor ([facility] [address]  Was on cimzia during pregnancy, continues on low-dose prednisone at this time  F/u with rheum encouraged    3. Anxiety  Moderate persistent sx  Discussed tx options. Trial paxil 10mg qd, Discussed use, benefit, and side effects of prescribed medications. Barriers to medication compliance addressed. All patient questions answered. Pt voiced understanding. F/u 1 month  Discussed breastfeeding safety in pts on SSRI           Follow-up appointment:   1 month    Discussed use, benefit, and side effects of all prescribed medications. Barriers to medication compliance addressed. All patient questions answered. Pt voiced understanding. When applicable, patient's outside records were reviewed through [facility]. The patient has signed appropriate paperworks/consents.

## 2022-05-06 ENCOUNTER — OFFICE VISIT (OUTPATIENT)
Dept: FAMILY MEDICINE CLINIC | Age: 37
End: 2022-05-06
Payer: COMMERCIAL

## 2022-05-06 VITALS
SYSTOLIC BLOOD PRESSURE: 110 MMHG | HEART RATE: 87 BPM | OXYGEN SATURATION: 97 % | WEIGHT: 117.6 LBS | TEMPERATURE: 97.1 F | BODY MASS INDEX: 20.19 KG/M2 | DIASTOLIC BLOOD PRESSURE: 72 MMHG | RESPIRATION RATE: 12 BRPM

## 2022-05-06 DIAGNOSIS — M94.0 COSTOCHONDRITIS, ACUTE: ICD-10-CM

## 2022-05-06 DIAGNOSIS — J01.90 ACUTE SINUSITIS, RECURRENCE NOT SPECIFIED, UNSPECIFIED LOCATION: Primary | ICD-10-CM

## 2022-05-06 DIAGNOSIS — M06.09 RHEUMATOID ARTHRITIS OF MULTIPLE SITES WITH NEGATIVE RHEUMATOID FACTOR (HCC): ICD-10-CM

## 2022-05-06 PROCEDURE — G8427 DOCREV CUR MEDS BY ELIG CLIN: HCPCS | Performed by: FAMILY MEDICINE

## 2022-05-06 PROCEDURE — 1036F TOBACCO NON-USER: CPT | Performed by: FAMILY MEDICINE

## 2022-05-06 PROCEDURE — 99214 OFFICE O/P EST MOD 30 MIN: CPT | Performed by: FAMILY MEDICINE

## 2022-05-06 PROCEDURE — G8420 CALC BMI NORM PARAMETERS: HCPCS | Performed by: FAMILY MEDICINE

## 2022-05-06 RX ORDER — AMOXICILLIN AND CLAVULANATE POTASSIUM 875; 125 MG/1; MG/1
1 TABLET, FILM COATED ORAL 2 TIMES DAILY
Qty: 20 TABLET | Refills: 0 | Status: SHIPPED | OUTPATIENT
Start: 2022-05-06 | End: 2022-05-16

## 2022-05-06 NOTE — PROGRESS NOTES
Patient is here for nasal congestion and sore throat . Patient started with fever = 100 in the evening. Slight dry cough. She started with symptoms 2-3 days ago . She had a cold 1-2 weeks ago and thought she was over it. No nausea, vomiting, diarrhea . Some headaches , but worse initially. Took Ibuprofen 600 mg at 10 am.   No shortness of breath or wheezing. Some left sided chest pain - which often happens with RA flare. She was on Sabine monthly, but stopped after pregnancy . She was on Prednisone 5 mg , but not taking. Her rheumatologist is at [facility]. Her kids have been sick X 1.5 months. Her 3 yo daughter was diagnosed with pneumonia on Monday clinically and was placed on Azithromycin . Daughter had a fever = 103 over the weekend. H/o RA, but off medications currently. Review of Systems    ROS: All other systems were reviewed and are negative . Patient's allergies and medications were reviewed. Patient's past medical, surgical, social , and family history were reviewed. OBJECTIVE:  /72   Pulse 87   Temp 97.1 °F (36.2 °C) (Temporal)   Resp 12   Wt 117 lb 9.6 oz (53.3 kg)   SpO2 97%   Breastfeeding Unknown   BMI 20.19 kg/m²     Physical Exam    General: NAD, cooperative, alert and oriented X 3. Mood / affect is good. good insight. well hydrated. HEENT: PERRLA, EOMI, TMs - clear. Nasopharynx clear. Neck : no lymphadenopathy, supple, FROM  CV: Regular rate and rhythm , no murmurs/ rub/ gallop. No edema. Lungs : CTA bilaterally, breathing comfortably. Tenderness to left intercostal area. No edema , erythema or ecchymosis. Abdomen: positive bowel sounds, soft , non tender, non distended. No hepatosplenomegaly. No CVA tenderness. Skin: no rashes. Non tender. ASSESSMENT/  PLAN:  1. Acute sinusitis, recurrence not specified, unspecified location  - Push fluids - water. - amoxicillin-clavulanate (AUGMENTIN) 875-125 MG per tablet;  Take 1 tablet by mouth 2 times daily for 10 days  Dispense: 20 tablet; Refill: 0    2. Costochondritis, acute  - Moist heat . ROM exercises. Modify activities. - if increased symptoms, start Prednisone 5 mg qd X 7 days. 3. Rheumatoid arthritis of multiple sites with [facility] ([facility]  - Followed by Rheumatologist.    F/u if no improvement 7-10d/ prn increased symptoms.

## 2022-06-23 ENCOUNTER — PATIENT MESSAGE (OUTPATIENT)
Dept: FAMILY MEDICINE CLINIC | Age: 37
End: 2022-06-23

## 2022-06-23 DIAGNOSIS — M06.09 RHEUMATOID ARTHRITIS OF MULTIPLE SITES WITH NEGATIVE RHEUMATOID FACTOR (HCC): Primary | ICD-10-CM

## 2022-06-23 DIAGNOSIS — M19.90 INFLAMMATORY ARTHRITIS: ICD-10-CM

## 2022-06-23 NOTE — TELEPHONE ENCOUNTER
From: Jacqueline Rizzo  To: Dr. Yadi Virk: 6/23/2022 3:38 AM EDT  Subject: Referral    Hi Dr. Dorie Ramírez,   I scheduled an apt with you for end of July. Unfortunately, now that Im done breastfeeding, all my joint pain, etc has returned. My wrists, hands, feet, hips, etc are very painful. The exhaustion and dizziness is back, too. I am starting Imuran for RA (per my current rheumatologist). Justin and I are not feeling confident that the rheumatologist has figured out whats really going on. We are both concerned that Im starting medicine when we arent even sure the rheum knows thats what I need. I couldnt get an apt with her, and she prescribed this med without even running basic inflammatory labs (I havent had those checked in about 1.5 years.) Im thinking maybe a second opinion from a different rheum would make sense. If I have someone in mind, are you able to specifically refer me to them? I cant use [facility] as most of those docs arent covered under my insurance. Im interested in seeing either Dr. Cristhian Simons or Dr. Jaclyn Clay from [facility] if possible. Also just   interested in what you may think in general about what may be going on, and if in fact you do think the RA diagnosis is accurate. Appreciate you reading this novel-thanks!      Suzanna Karimi

## 2022-06-24 NOTE — TELEPHONE ENCOUNTER
Yes Id just like the usual bloodwork done that indicates where I am from an inflammation standpoint. I think she believes Im seronegative, which I get, but I still think we should be monitoring. Normally she runs all that, but this time she just ran some that assured her I was ok to start the meds she prescribed. I called about Dr. Greer Landa, and they said his appointments are booking for [facility]. So the sooner I can get a referral to one of them the better. Maybe we should try Dr. Mari De La Garza (I know her wait could be just as long).

## 2022-06-28 DIAGNOSIS — M06.09 RHEUMATOID ARTHRITIS OF MULTIPLE SITES WITH NEGATIVE RHEUMATOID FACTOR (HCC): ICD-10-CM

## 2022-06-28 DIAGNOSIS — M19.90 INFLAMMATORY ARTHRITIS: ICD-10-CM

## 2022-06-28 LAB
A/G RATIO: 1.8 (ref 1.1–2.2)
ALBUMIN SERPL-MCNC: 4.3 G/DL (ref 3.4–5)
ALP BLD-CCNC: 66 U/L (ref 40–129)
ALT SERPL-CCNC: 12 U/L (ref 10–40)
ANION GAP SERPL CALCULATED.3IONS-SCNC: 12 MMOL/L (ref 3–16)
AST SERPL-CCNC: 17 U/L (ref 15–37)
BASOPHILS ABSOLUTE: 0 K/UL (ref 0–0.2)
BASOPHILS RELATIVE PERCENT: 1 %
BILIRUB SERPL-MCNC: 0.4 MG/DL (ref 0–1)
BUN BLDV-MCNC: 15 MG/DL (ref 7–20)
C-REACTIVE PROTEIN: <3 MG/L (ref 0–5.1)
CALCIUM SERPL-MCNC: 9.6 MG/DL (ref 8.3–10.6)
CHLORIDE BLD-SCNC: 103 MMOL/L (ref 99–110)
CO2: 25 MMOL/L (ref 21–32)
CREAT SERPL-MCNC: 0.9 MG/DL (ref 0.6–1.1)
EOSINOPHILS ABSOLUTE: 0.1 K/UL (ref 0–0.6)
EOSINOPHILS RELATIVE PERCENT: 1.7 %
GFR AFRICAN AMERICAN: >60
GFR NON-AFRICAN AMERICAN: >60
GLUCOSE BLD-MCNC: 61 MG/DL (ref 70–99)
HCT VFR BLD CALC: 38.3 % (ref 36–48)
HEMOGLOBIN: 12.9 G/DL (ref 12–16)
LYMPHOCYTES ABSOLUTE: 1.6 K/UL (ref 1–5.1)
LYMPHOCYTES RELATIVE PERCENT: 34.8 %
MCH RBC QN AUTO: 30.6 PG (ref 26–34)
MCHC RBC AUTO-ENTMCNC: 33.6 G/DL (ref 31–36)
MCV RBC AUTO: 91.1 FL (ref 80–100)
MONOCYTES ABSOLUTE: 0.5 K/UL (ref 0–1.3)
MONOCYTES RELATIVE PERCENT: 9.9 %
NEUTROPHILS ABSOLUTE: 2.4 K/UL (ref 1.7–7.7)
NEUTROPHILS RELATIVE PERCENT: 52.6 %
PDW BLD-RTO: 12.7 % (ref 12.4–15.4)
PLATELET # BLD: 172 K/UL (ref 135–450)
PMV BLD AUTO: 11 FL (ref 5–10.5)
POTASSIUM SERPL-SCNC: 3.8 MMOL/L (ref 3.5–5.1)
RBC # BLD: 4.21 M/UL (ref 4–5.2)
RHEUMATOID FACTOR: <10 IU/ML
SEDIMENTATION RATE, ERYTHROCYTE: 4 MM/HR (ref 0–20)
SODIUM BLD-SCNC: 140 MMOL/L (ref 136–145)
TOTAL PROTEIN: 6.7 G/DL (ref 6.4–8.2)
TSH SERPL DL<=0.05 MIU/L-ACNC: 1.52 UIU/ML (ref 0.27–4.2)
WBC # BLD: 4.6 K/UL (ref 4–11)

## 2022-07-01 NOTE — TELEPHONE ENCOUNTER
Dr. Chata Solis office is closed for the weekend.  Our office will try again Tuesday due to holiday weekend

## 2022-07-25 ENCOUNTER — HOSPITAL ENCOUNTER (OUTPATIENT)
Dept: GENERAL RADIOLOGY | Age: 37
Discharge: HOME | End: 2022-07-25
Payer: COMMERCIAL

## 2022-07-25 ENCOUNTER — HOSPITAL ENCOUNTER (OUTPATIENT)
Age: 37
Discharge: HOME | End: 2022-07-25
Payer: COMMERCIAL

## 2022-07-25 ENCOUNTER — OFFICE VISIT (OUTPATIENT)
Dept: FAMILY MEDICINE CLINIC | Age: 37
End: 2022-07-25
Payer: COMMERCIAL

## 2022-07-25 VITALS
HEART RATE: 68 BPM | BODY MASS INDEX: 20.35 KG/M2 | HEIGHT: 64 IN | RESPIRATION RATE: 12 BRPM | TEMPERATURE: 97.8 F | OXYGEN SATURATION: 97 % | WEIGHT: 119.2 LBS | SYSTOLIC BLOOD PRESSURE: 110 MMHG | DIASTOLIC BLOOD PRESSURE: 74 MMHG

## 2022-07-25 DIAGNOSIS — M06.09 RHEUMATOID ARTHRITIS OF MULTIPLE SITES WITH NEGATIVE RHEUMATOID FACTOR (HCC): ICD-10-CM

## 2022-07-25 DIAGNOSIS — M19.90 INFLAMMATORY ARTHRITIS: ICD-10-CM

## 2022-07-25 DIAGNOSIS — Z00.00 ROUTINE GENERAL MEDICAL EXAMINATION AT A HEALTH CARE FACILITY: Primary | ICD-10-CM

## 2022-07-25 DIAGNOSIS — F41.9 ANXIETY: ICD-10-CM

## 2022-07-25 PROCEDURE — 73130 X-RAY EXAM OF HAND: CPT

## 2022-07-25 PROCEDURE — 73610 X-RAY EXAM OF ANKLE: CPT

## 2022-07-25 PROCEDURE — 99395 PREV VISIT EST AGE 18-39: CPT | Performed by: FAMILY MEDICINE

## 2022-07-25 PROCEDURE — 73630 X-RAY EXAM OF FOOT: CPT

## 2022-07-25 RX ORDER — PAROXETINE 10 MG/1
10 TABLET, FILM COATED ORAL DAILY
Qty: 30 TABLET | Refills: 3 | Status: SHIPPED | OUTPATIENT
Start: 2022-07-25

## 2022-07-25 ASSESSMENT — PATIENT HEALTH QUESTIONNAIRE - PHQ9
2. FEELING DOWN, DEPRESSED OR HOPELESS: 0
SUM OF ALL RESPONSES TO PHQ9 QUESTIONS 1 & 2: 0
SUM OF ALL RESPONSES TO PHQ QUESTIONS 1-9: 0
SUM OF ALL RESPONSES TO PHQ QUESTIONS 1-9: 0
1. LITTLE INTEREST OR PLEASURE IN DOING THINGS: 0
SUM OF ALL RESPONSES TO PHQ QUESTIONS 1-9: 0
SUM OF ALL RESPONSES TO PHQ QUESTIONS 1-9: 0

## 2022-07-25 NOTE — PROGRESS NOTES
Here for well checkup, physical.  Pt states that things are doing well, pt was able to get out of town to [facility], was down there for 2 weeks, half with family and half on their own. Pt is working with rheumatology for her Rheumatoid Arthritis concerns. Pt has been dealing with these issues for 3 years, working with UC and they did diagnose her with RA. They initially thought was fibromyalgia but with persistent sx, did get a dx of seronegative RA. Recent bloodwork was normal.  Pt felt that when she was pregnant and breastfeeding, did have a moderate flare of her symptoms. She stopped breastfeeding they discussed medication therapy. They discussed MTX but was concerned of her potential child bearing and was sent in rx for imuran. She has not started therapy at this time, as she is trying to push through her sx. Pt is able to still get up and exercise, and pushes through symptoms. Her joint sx are most significant in ankles, feet, and hands/wrists. Mild sx in knees. Pt was given rx for paxil but has not started at this time, but does want to start as she is done breastfeeding      Except as noted in the history of present illness as above, the review of systems is negative for the following:    General ROS: negative  Psychological ROS: negative  Allergy and Immunology ROS: negative  Hematological and Lymphatic ROS: negative  Respiratory ROS: no cough, shortness of breath, or wheezing  Cardiovascular ROS: no chest pain or dyspnea on exertion  Gastrointestinal ROS: no abdominal pain, change in bowel habits, or black or bloody stools  Genito-Urinary ROS: no dysuria, trouble voiding, or hematuria  Musculoskeletal ROS: negative  Dermatological ROS: negative      Past medical, surgical, and social history reviewed and updated.    Medications and allergies reviewed and updated        /74   Pulse 68   Temp 97.8 °F (36.6 °C) (Temporal)   Resp 12   Ht 5' 3.8\" (1.621 m)   Wt 119 lb 3.2 oz (54.1 kg)   LMP 07/06/2022 (Approximate)   SpO2 97%   Breastfeeding No   BMI 20.59 kg/m²   General appearance - healthy, alert, no distress  Skin - Skin color, texture, turgor normal. No rashes or lesions. No suspicious findings  Head - Normocephalic. No masses, lesions, tenderness or abnormalities  Eyes - conjunctivae/corneas clear. Pupils equal and reactive to light and accomodation, extraocular muscles intact. Ears - External ears normal. Canals clear. Tympanic membranes normal bilaterally. Nose/Sinuses - Nares normal. Septum midline. Mucosa normal. No drainage or sinus tenderness. Oropharynx - Lips, mucosa, and tongue normal. Teeth and gums normal.   Neck - Neck supple. No adenopathy. Thyroid symmetric, normal size, no carotid bruit bilaterally  Back - Back symmetric, no curvature. Range of motion normal. No Costovertebral angle tenderness. Lungs - Percussion normal. Good diaphragmatic excursion. Lungs clear without wheeze, rales, crackles  Heart - Regular rate and rhythm, with no rub, murmur or gallop noted. Abdomen - Abdomen soft, non-tender. Bowel sounds normal. No masses, tenderness or organomegaly  Extremities - Extremities normal. No deformities, edema, or skin discoloration  Musculoskeletal - Spine ROM normal. Muscular strength intact. Peripheral pulses - radial=4/4,, femoral=4/4, popliteal=4/4, dorsalis pedis=4/4,  Neuro - Gait normal. Reflexes normal and symmetric. Sensation grossly normal.  No focal weakness  Psych - euthymic, no suicidal thoughts or ideation, mood stable.       Orders Only on 06/28/2022   Component Date Value Ref Range Status    WBC 06/28/2022 4.6  4.0 - 11.0 K/uL Final    RBC 06/28/2022 4.21  4.00 - 5.20 M/uL Final    Hemoglobin 06/28/2022 12.9  12.0 - 16.0 g/dL Final    Hematocrit 06/28/2022 38.3  36.0 - 48.0 % Final    MCV 06/28/2022 91.1  80.0 - 100.0 fL Final    MCH 06/28/2022 30.6  26.0 - 34.0 pg Final    MCHC 06/28/2022 33.6  31.0 - 36.0 g/dL Final    RDW 06/28/2022 12.7 12.4 - 15.4 % Final    Platelets 70/34/5750 172  135 - 450 K/uL Final    MPV 06/28/2022 11.0 (A) 5.0 - 10.5 fL Final    Neutrophils % 06/28/2022 52.6  % Final    Lymphocytes % 06/28/2022 34.8  % Final    Monocytes % 06/28/2022 9.9  % Final    Eosinophils % 06/28/2022 1.7  % Final    Basophils % 06/28/2022 1.0  % Final    Neutrophils Absolute 06/28/2022 2.4  1.7 - 7.7 K/uL Final    Lymphocytes Absolute 06/28/2022 1.6  1.0 - 5.1 K/uL Final    Monocytes Absolute 06/28/2022 0.5  0.0 - 1.3 K/uL Final    Eosinophils Absolute 06/28/2022 0.1  0.0 - 0.6 K/uL Final    Basophils Absolute 06/28/2022 0.0  0.0 - 0.2 K/uL Final    Sodium 06/28/2022 140  136 - 145 mmol/L Final    Potassium 06/28/2022 3.8  3.5 - 5.1 mmol/L Final    Chloride 06/28/2022 103  99 - 110 mmol/L Final    CO2 06/28/2022 25  21 - 32 mmol/L Final    Anion Gap 06/28/2022 12  3 - 16 Final    Glucose 06/28/2022 61 (A) 70 - 99 mg/dL Final    BUN 06/28/2022 15  7 - 20 mg/dL Final    Creatinine 06/28/2022 0.9  0.6 - 1.1 mg/dL Final    GFR Non- 06/28/2022 >60  >60 Final    Comment: >60 mL/min/1.73m2 EGFR, calc. for ages 25 and older using the  MDRD formula (not corrected for weight), is valid for stable  renal function. GFR  06/28/2022 >60  >60 Final    Comment: Chronic Kidney Disease: less than 60 ml/min/1.73 sq.m. Kidney Failure: less than 15 ml/min/1.73 sq.m. Results valid for patients 18 years and older. Calcium 06/28/2022 9.6  8.3 - 10.6 mg/dL Final    Total Protein 06/28/2022 6.7  6.4 - 8.2 g/dL Final    Albumin 06/28/2022 4.3  3.4 - 5.0 g/dL Final    Albumin/Globulin Ratio 06/28/2022 1.8  1.1 - 2.2 Final    Total Bilirubin 06/28/2022 0.4  0.0 - 1.0 mg/dL Final    Alkaline Phosphatase 06/28/2022 66  40 - 129 U/L Final    ALT 06/28/2022 12  10 - 40 U/L Final    AST 06/28/2022 17  15 - 37 U/L Final    TSH 06/28/2022 1.52  0.27 - 4.20 uIU/mL Final    JESSE 06/28/2022 Negative  Negative Final    Comment:  If POSITIVE, specimen will be sent to reference laboratory  for further testing. JESSE specimens are screened using multiplex bead immunoassay  methodology. All results reported as positive are further tested  by indirect fluorescent assay (IFA) using Hep-2 substrate with  an IgG-specific conjugate. The JESSE screen is designed to detect  antibodies against dsDNA, SS-A (Ro), SS-B (La), Ray (Sm), SmRNP,  RNP, Scl-70, Priyanka-1, Centromere, Chromatin, and Ribosomal P. Sed Rate 2022 4  0 - 20 mm/Hr Final    Rheumatoid Factor 2022 <10.0  <14 IU/mL Final    CRP 2022 <3.0  0.0 - 5.1 mg/L Final    Comment: WR-CRP Reference range:  30D-199Y    <5.1  <30D        Not established    CRP is used in the detection and evaluation of infection,  tissue injury, inflammatory disorders, and associated  disease. Increases in CRP values are non-specific and  should not be interpreted without a complete clinical  evaluation.  reference ranges have not been  established and values should be interpreted within clinical  context and with serial measurements, if clinically  appropriate. CC Peptide,IgG Ab 2022 <0.5  0.0 - 2.9 U/mL Final         ASSESSMENT / PLAN:    1. Routine general medical examination at a health care facility  No focal abnormalities on exam  Anticipatory guidance discussed. 2. Rheumatoid arthritis of multiple sites with negative rheumatoid factor ([facility] [address]  Cont f/u with rheum  Working to get 2nd opinion, currently working with UC  Recent bloodwork normal/negative  Check xrays as below  Consider starting imuran, await xray results  Consider gabapentin/lyrica/cymbalta  - XR HAND LEFT (MIN 3 VIEWS); Future  - XR HAND RIGHT (MIN 3 VIEWS); Future  - XR ANKLE LEFT (MIN 3 VIEWS); Future  - XR ANKLE RIGHT (MIN 3 VIEWS); Future  - XR FOOT LEFT (MIN 3 VIEWS); Future  - XR FOOT RIGHT (MIN 3 VIEWS); Future    3. Inflammatory arthritis  As above  ?  If component of fibromyalgia  bloodwork normal  Cont f/u with rheum  Consider gabapentin/lyrica/cymbalta  - XR HAND LEFT (MIN 3 VIEWS); Future  - XR HAND RIGHT (MIN 3 VIEWS); Future  - XR ANKLE LEFT (MIN 3 VIEWS); Future  - XR ANKLE RIGHT (MIN 3 VIEWS); Future  - XR FOOT LEFT (MIN 3 VIEWS); Future  - XR FOOT RIGHT (MIN 3 VIEWS); Future    4. Anxiety  Ok to start low dose paxil at 10mg qd  Discussed cymbalta for mood as well as chronic pain but concerned on side effects  Will see how things do on paxil           Follow-up appointment:   1 month    Discussed use, benefit, and side effects of all prescribed medications. Barriers to medication compliance addressed. All patient questions answered. Pt voiced understanding. When applicable, patient's outside records were reviewed through [place]. The patient has signed appropriate paperworks/consents.

## 2022-08-12 ENCOUNTER — OFFICE VISIT (OUTPATIENT)
Dept: FAMILY MEDICINE CLINIC | Age: 37
End: 2022-08-12
Payer: COMMERCIAL

## 2022-08-12 ENCOUNTER — TELEPHONE (OUTPATIENT)
Dept: FAMILY MEDICINE CLINIC | Age: 37
End: 2022-08-12

## 2022-08-12 VITALS
OXYGEN SATURATION: 98 % | DIASTOLIC BLOOD PRESSURE: 80 MMHG | WEIGHT: 117 LBS | TEMPERATURE: 97.3 F | BODY MASS INDEX: 20.21 KG/M2 | HEART RATE: 79 BPM | SYSTOLIC BLOOD PRESSURE: 100 MMHG | RESPIRATION RATE: 16 BRPM

## 2022-08-12 DIAGNOSIS — R06.02 SHORTNESS OF BREATH: Primary | ICD-10-CM

## 2022-08-12 DIAGNOSIS — U07.1 COVID-19 VIRUS INFECTION: ICD-10-CM

## 2022-08-12 DIAGNOSIS — R06.02 SHORTNESS OF BREATH: ICD-10-CM

## 2022-08-12 LAB
ANION GAP SERPL CALCULATED.3IONS-SCNC: 11 MMOL/L (ref 3–16)
BASOPHILS ABSOLUTE: 0 K/UL (ref 0–0.2)
BASOPHILS RELATIVE PERCENT: 0.5 %
BUN BLDV-MCNC: 11 MG/DL (ref 7–20)
CALCIUM SERPL-MCNC: 9.2 MG/DL (ref 8.3–10.6)
CHLORIDE BLD-SCNC: 105 MMOL/L (ref 99–110)
CO2: 26 MMOL/L (ref 21–32)
CREAT SERPL-MCNC: 0.8 MG/DL (ref 0.6–1.1)
D DIMER: <0.27 UG/ML FEU (ref 0–0.6)
EOSINOPHILS ABSOLUTE: 0.1 K/UL (ref 0–0.6)
EOSINOPHILS RELATIVE PERCENT: 1.9 %
GFR AFRICAN AMERICAN: >60
GFR NON-AFRICAN AMERICAN: >60
GLUCOSE BLD-MCNC: 85 MG/DL (ref 70–99)
HCT VFR BLD CALC: 39.4 % (ref 36–48)
HEMOGLOBIN: 13.2 G/DL (ref 12–16)
LYMPHOCYTES ABSOLUTE: 1.6 K/UL (ref 1–5.1)
LYMPHOCYTES RELATIVE PERCENT: 43.8 %
MCH RBC QN AUTO: 30.1 PG (ref 26–34)
MCHC RBC AUTO-ENTMCNC: 33.5 G/DL (ref 31–36)
MCV RBC AUTO: 89.8 FL (ref 80–100)
MONOCYTES ABSOLUTE: 0.3 K/UL (ref 0–1.3)
MONOCYTES RELATIVE PERCENT: 7.9 %
NEUTROPHILS ABSOLUTE: 1.7 K/UL (ref 1.7–7.7)
NEUTROPHILS RELATIVE PERCENT: 45.9 %
PDW BLD-RTO: 12.7 % (ref 12.4–15.4)
PLATELET # BLD: 181 K/UL (ref 135–450)
PLATELET SLIDE REVIEW: ADEQUATE
PMV BLD AUTO: 11.3 FL (ref 5–10.5)
POTASSIUM SERPL-SCNC: 4.1 MMOL/L (ref 3.5–5.1)
RBC # BLD: 4.38 M/UL (ref 4–5.2)
SLIDE REVIEW: ABNORMAL
SODIUM BLD-SCNC: 142 MMOL/L (ref 136–145)
WBC # BLD: 3.7 K/UL (ref 4–11)

## 2022-08-12 PROCEDURE — G8427 DOCREV CUR MEDS BY ELIG CLIN: HCPCS | Performed by: FAMILY MEDICINE

## 2022-08-12 PROCEDURE — 99214 OFFICE O/P EST MOD 30 MIN: CPT | Performed by: FAMILY MEDICINE

## 2022-08-12 PROCEDURE — 1036F TOBACCO NON-USER: CPT | Performed by: FAMILY MEDICINE

## 2022-08-12 PROCEDURE — G8420 CALC BMI NORM PARAMETERS: HCPCS | Performed by: FAMILY MEDICINE

## 2022-08-12 NOTE — PROGRESS NOTES
Subjective:      Patient ID: Ulysses Roots 42 y.o. female. is here for evaluation for dyspnea      HPI  Diagnosed with COVID 8 days ago. Temp 100 day 1 only. ST, fatigue, arthralgias. Was better for 2 days, then yesterday had recurrent fatigue, chest tightness, dyspnea. Dry cough. She has hx costochondritis - this feels different. Hurts to take a deep breath. Mild dyspnea. Denies pedal edema. Rx: ibuprofen and Tylenol helps a bit. Is vaccinated      Outpatient Medications Marked as Taking for the 22 encounter (Office Visit) with Paula Archuleta MD   Medication Sig Dispense Refill    PARoxetine (PAXIL) 10 MG tablet Take 1 tablet by mouth in the morning.  30 tablet 3    Prenatal Multivit-Min-Fe-FA (PRE-SUSHIL PO) Take by mouth      ibuprofen (ADVIL;MOTRIN) 200 MG tablet Take 200 mg by mouth           No Known Allergies    Patient Active Problem List   Diagnosis    Fibrocystic disease of breast    Migraine    Hx of ectopic pregnancy    Attention deficit hyperactivity disorder, predominantly inattentive type    Anxiety    Pericarditis    Myalgia    LGSIL of cervix of undetermined significance    Left-sided chest pain    Headache    Encounter for supervision of other normal pregnancy    Dizziness    Anorexia    Abnormal Pap smear of cervix    Prior pregnancy complicated by SGA (small for gestational age), antepartum    Inflammatory arthritis    Rheumatoid arthritis of multiple sites with negative rheumatoid factor ([address]       Past Medical History:   Diagnosis Date    Anxiety 2015    Fibrocystic breast disease 2/3/2012    History of eating disorder     Migraine headache 2/3/2012    Rheumatoid arthritis of multiple sites with negative rheumatoid factor ([address] 2021       Past Surgical History:   Procedure Laterality Date    COLPOSCOPY      DILATION AND CURETTAGE OF UTERUS  2020    d/t miscarriage    SALPINGO-OOPHORECTOMY Left     TYMPANOSTOMY TUBE PLACEMENT          Family History Problem Relation Age of Onset    Coronary Art Dis Father 43    Hypertension Father        Social History     Tobacco Use    Smoking status: Never    Smokeless tobacco: Never            Review of Systems  Review of Systems    Objective:   Physical Exam  Vitals:    08/12/22 1200   BP: 100/80   Pulse: 79   Resp: 16   Temp: 97.3 °F (36.3 °C)   TempSrc: Temporal   SpO2: 98%   Weight: 117 lb (53.1 kg)       Physical Exam  NAD   No respiratory distress. Skin is warm and dry. Well hydrated, no rash. The neck is supple and free of adenopathy or masses, the thyroid is normal without enlargement or nodules. Chest is clear, no wheezing or rales. Normal symmetric air entry throughout both lung fields. Heart regular with normal rate, no murmer or gallop   LE no edema or calf tenderness        Diagnosis Orders   1. Shortness of breath  CBC with Auto Differential    Basic Metabolic Panel    D-Dimer, Quantitative      2. COVID-19 virus infection  D-Dimer, Quantitative             Plan:      Likely just fluctuating COVID sxs but rule out secondary infection,  PE. Discussed. Call or return to clinic prn if these symptoms worsen or fail to improve as anticipated.

## 2022-08-12 NOTE — TELEPHONE ENCOUNTER
[facility] (call center was not on the line)  shortness of breath and chest tightness    Pt stated that she tested positive for Covid a 8 days ago and was starting to feel better but then yesterday she started to have chest pressure and shortness of breath. She has a pulse ox and her ox level is running between 98-90. She stated it hurts when she takes a real deep breath. She has no other sx. She would like for her lungs to be listened to. Spoke with stephanie and she has agreed to see the pt today.     Pt advised appt made

## 2023-04-03 ENCOUNTER — OFFICE VISIT (OUTPATIENT)
Dept: FAMILY MEDICINE CLINIC | Age: 38
End: 2023-04-03
Payer: COMMERCIAL

## 2023-04-03 VITALS
BODY MASS INDEX: 20.49 KG/M2 | DIASTOLIC BLOOD PRESSURE: 68 MMHG | SYSTOLIC BLOOD PRESSURE: 104 MMHG | WEIGHT: 118.6 LBS | TEMPERATURE: 97.8 F | HEART RATE: 73 BPM | RESPIRATION RATE: 14 BRPM | OXYGEN SATURATION: 99 %

## 2023-04-03 DIAGNOSIS — Z00.00 ROUTINE GENERAL MEDICAL EXAMINATION AT A HEALTH CARE FACILITY: ICD-10-CM

## 2023-04-03 DIAGNOSIS — R63.0 ANOREXIA: ICD-10-CM

## 2023-04-03 DIAGNOSIS — F41.9 ANXIETY: Primary | ICD-10-CM

## 2023-04-03 PROBLEM — Z34.80 ENCOUNTER FOR SUPERVISION OF OTHER NORMAL PREGNANCY: Status: RESOLVED | Noted: 2017-01-27 | Resolved: 2023-04-03

## 2023-04-03 PROCEDURE — G8420 CALC BMI NORM PARAMETERS: HCPCS | Performed by: FAMILY MEDICINE

## 2023-04-03 PROCEDURE — 1036F TOBACCO NON-USER: CPT | Performed by: FAMILY MEDICINE

## 2023-04-03 PROCEDURE — 99214 OFFICE O/P EST MOD 30 MIN: CPT | Performed by: FAMILY MEDICINE

## 2023-04-03 PROCEDURE — G8427 DOCREV CUR MEDS BY ELIG CLIN: HCPCS | Performed by: FAMILY MEDICINE

## 2023-04-03 RX ORDER — ALPRAZOLAM 0.5 MG/1
0.5 TABLET ORAL 3 TIMES DAILY PRN
Qty: 30 TABLET | Refills: 0 | Status: SHIPPED | OUTPATIENT
Start: 2023-04-03 | End: 2023-05-03

## 2023-04-03 RX ORDER — PAROXETINE 10 MG/1
TABLET, FILM COATED ORAL
Qty: 46 TABLET | Refills: 3 | Status: SHIPPED | OUTPATIENT
Start: 2023-04-03

## 2023-04-03 SDOH — ECONOMIC STABILITY: FOOD INSECURITY: WITHIN THE PAST 12 MONTHS, YOU WORRIED THAT YOUR FOOD WOULD RUN OUT BEFORE YOU GOT MONEY TO BUY MORE.: NEVER TRUE

## 2023-04-03 SDOH — ECONOMIC STABILITY: FOOD INSECURITY: WITHIN THE PAST 12 MONTHS, THE FOOD YOU BOUGHT JUST DIDN'T LAST AND YOU DIDN'T HAVE MONEY TO GET MORE.: NEVER TRUE

## 2023-04-03 SDOH — ECONOMIC STABILITY: INCOME INSECURITY: HOW HARD IS IT FOR YOU TO PAY FOR THE VERY BASICS LIKE FOOD, HOUSING, MEDICAL CARE, AND HEATING?: NOT HARD AT ALL

## 2023-04-03 SDOH — ECONOMIC STABILITY: HOUSING INSECURITY
IN THE LAST 12 MONTHS, WAS THERE A TIME WHEN YOU DID NOT HAVE A STEADY PLACE TO SLEEP OR SLEPT IN A SHELTER (INCLUDING NOW)?: NO

## 2023-04-03 ASSESSMENT — PATIENT HEALTH QUESTIONNAIRE - PHQ9
SUM OF ALL RESPONSES TO PHQ QUESTIONS 1-9: 0
SUM OF ALL RESPONSES TO PHQ9 QUESTIONS 1 & 2: 0
2. FEELING DOWN, DEPRESSED OR HOPELESS: 0
SUM OF ALL RESPONSES TO PHQ QUESTIONS 1-9: 0
1. LITTLE INTEREST OR PLEASURE IN DOING THINGS: 0

## 2023-04-03 NOTE — PROGRESS NOTES
x 14d, then 2 tabs po QD 46 tablet 3    ALPRAZolam (XANAX) 0.5 MG tablet Take 1 tablet by mouth 3 times daily as needed for Anxiety for up to 30 days. 30 tablet 0    therapeutic multivitamin-minerals (THERAGRAN-M) tablet Take 1 tablet by mouth daily       No current facility-administered medications for this visit. ASSESSMENT / PLAN:    1. Anxiety  Moderate sx severity  Discussed tx options. Cont f/u with psychology  Restart paxil 10mg qd x 14d, then increase to 20mg qd  Discussed use, benefit, and side effects of prescribed medications. Barriers to medication compliance addressed. All patient questions answered. Pt voiced understanding. F/u 1 month  Cont prn xanax, use intermittent  See [facility]  - ALPRAZolam (XANAX) 0.5 MG tablet; Take 1 tablet by mouth 3 times daily as needed for Anxiety for up to 30 days. Dispense: 30 tablet; Refill: 0    2. Anorexia  Weight stable, cont to monitor closely  Cont f/u with psychology  Check bloodwork as below    3. Routine general medical examination at a health care facility  - CBC with Auto Differential; Future  - Comprehensive Metabolic Panel; Future  - TSH; Future  - Lipid Panel; Future  - Hemoglobin A1C; Future  - Vitamin B12 & Folate; Future  - T4, Free; Future           Follow-up appointment:   Pending bloodwork  1 month  Prn     Discussed use, benefit, and side effects of all prescribed medications. Barriers to medication compliance addressed. All patient questions answered. Pt voiced understanding. When applicable, patient's outside records were [facility]. The patient has signed appropriate paperworks/consents.

## 2023-04-04 DIAGNOSIS — Z00.00 ROUTINE GENERAL MEDICAL EXAMINATION AT A HEALTH CARE FACILITY: ICD-10-CM

## 2023-04-04 LAB
ALBUMIN SERPL-MCNC: 4.3 G/DL (ref 3.4–5)
ALBUMIN/GLOB SERPL: 2 {RATIO} (ref 1.1–2.2)
ALP SERPL-CCNC: 58 U/L (ref 40–129)
ALT SERPL-CCNC: 12 U/L (ref 10–40)
ANION GAP SERPL CALCULATED.3IONS-SCNC: 14 MMOL/L (ref 3–16)
AST SERPL-CCNC: 17 U/L (ref 15–37)
BASOPHILS # BLD: 0 K/UL (ref 0–0.2)
BASOPHILS NFR BLD: 0.9 %
BILIRUB SERPL-MCNC: 0.6 MG/DL (ref 0–1)
BUN SERPL-MCNC: 15 MG/DL (ref 7–20)
CALCIUM SERPL-MCNC: 9.4 MG/DL (ref 8.3–10.6)
CHLORIDE SERPL-SCNC: 106 MMOL/L (ref 99–110)
CHOLEST SERPL-MCNC: 170 MG/DL (ref 0–199)
CO2 SERPL-SCNC: 23 MMOL/L (ref 21–32)
CREAT SERPL-MCNC: 0.8 MG/DL (ref 0.6–1.1)
DEPRECATED RDW RBC AUTO: 13.6 % (ref 12.4–15.4)
EOSINOPHIL # BLD: 0.1 K/UL (ref 0–0.6)
EOSINOPHIL NFR BLD: 2.3 %
FOLATE SERPL-MCNC: 7.28 NG/ML (ref 4.78–24.2)
GFR SERPLBLD CREATININE-BSD FMLA CKD-EPI: >60 ML/MIN/{1.73_M2}
GLUCOSE SERPL-MCNC: 91 MG/DL (ref 70–99)
HCT VFR BLD AUTO: 38.3 % (ref 36–48)
HDLC SERPL-MCNC: 68 MG/DL (ref 40–60)
HGB BLD-MCNC: 12.6 G/DL (ref 12–16)
LDLC SERPL CALC-MCNC: 86 MG/DL
LYMPHOCYTES # BLD: 1.5 K/UL (ref 1–5.1)
LYMPHOCYTES NFR BLD: 35 %
MCH RBC QN AUTO: 30.2 PG (ref 26–34)
MCHC RBC AUTO-ENTMCNC: 32.9 G/DL (ref 31–36)
MCV RBC AUTO: 91.7 FL (ref 80–100)
MONOCYTES # BLD: 0.4 K/UL (ref 0–1.3)
MONOCYTES NFR BLD: 8.6 %
NEUTROPHILS # BLD: 2.3 K/UL (ref 1.7–7.7)
NEUTROPHILS NFR BLD: 53.2 %
PLATELET # BLD AUTO: 174 K/UL (ref 135–450)
PLATELET BLD QL SMEAR: ADEQUATE
PMV BLD AUTO: 11.8 FL (ref 5–10.5)
POTASSIUM SERPL-SCNC: 4.5 MMOL/L (ref 3.5–5.1)
PROT SERPL-MCNC: 6.5 G/DL (ref 6.4–8.2)
RBC # BLD AUTO: 4.17 M/UL (ref 4–5.2)
SLIDE REVIEW: ABNORMAL
SODIUM SERPL-SCNC: 143 MMOL/L (ref 136–145)
T4 FREE SERPL-MCNC: 1 NG/DL (ref 0.9–1.8)
TRIGL SERPL-MCNC: 79 MG/DL (ref 0–150)
TSH SERPL DL<=0.005 MIU/L-ACNC: 1.05 UIU/ML (ref 0.27–4.2)
VIT B12 SERPL-MCNC: 729 PG/ML (ref 211–911)
VLDLC SERPL CALC-MCNC: 16 MG/DL
WBC # BLD AUTO: 4.3 K/UL (ref 4–11)

## 2023-04-05 LAB
EST. AVERAGE GLUCOSE BLD GHB EST-MCNC: 102.5 MG/DL
HBA1C MFR BLD: 5.2 %

## 2023-05-16 ENCOUNTER — OFFICE VISIT (OUTPATIENT)
Dept: FAMILY MEDICINE CLINIC | Age: 38
End: 2023-05-16
Payer: COMMERCIAL

## 2023-05-16 VITALS
DIASTOLIC BLOOD PRESSURE: 68 MMHG | TEMPERATURE: 97.8 F | RESPIRATION RATE: 14 BRPM | BODY MASS INDEX: 20.69 KG/M2 | WEIGHT: 119.8 LBS | OXYGEN SATURATION: 98 % | SYSTOLIC BLOOD PRESSURE: 104 MMHG | HEART RATE: 57 BPM

## 2023-05-16 DIAGNOSIS — T75.3XXA MOTION SICKNESS, INITIAL ENCOUNTER: ICD-10-CM

## 2023-05-16 DIAGNOSIS — G43.009 MIGRAINE WITHOUT AURA AND WITHOUT STATUS MIGRAINOSUS, NOT INTRACTABLE: Primary | ICD-10-CM

## 2023-05-16 DIAGNOSIS — N94.6 DYSMENORRHEA: ICD-10-CM

## 2023-05-16 DIAGNOSIS — R63.0 ANOREXIA: ICD-10-CM

## 2023-05-16 DIAGNOSIS — F41.9 ANXIETY: ICD-10-CM

## 2023-05-16 PROCEDURE — 99214 OFFICE O/P EST MOD 30 MIN: CPT | Performed by: FAMILY MEDICINE

## 2023-05-16 RX ORDER — PAROXETINE HYDROCHLORIDE 20 MG/1
20 TABLET, FILM COATED ORAL DAILY
Qty: 30 TABLET | Refills: 5
Start: 2023-05-16

## 2023-05-16 RX ORDER — RIZATRIPTAN BENZOATE 10 MG/1
10 TABLET ORAL DAILY PRN
Qty: 9 TABLET | Refills: 5
Start: 2023-05-16

## 2023-05-16 RX ORDER — SCOLOPAMINE TRANSDERMAL SYSTEM 1 MG/1
1 PATCH, EXTENDED RELEASE TRANSDERMAL
Qty: 4 PATCH | Refills: 0 | Status: SHIPPED | OUTPATIENT
Start: 2023-05-16

## 2023-05-16 NOTE — PROGRESS NOTES
Here for f/u and recheck of anxiety, mood. Pt states that things are doing ok. At last visit, we restarted her on the paxil 10mg for a few days and then increased to 20mg. Pt has continued to monitor with counseling/therapy. Pt does feel that she is getting better overall, and has noted a difference. Pt has been on for 6 weeks and is happy with the benefit, and is not seeing much in terms of side effects. Pt has notes some mild increase sensitivity to EtOH. Pt will be going out of town with  and does have risk of getting motion sickness. Pt will be going to [facility]    Pt has been talking to [facility] about types/options of birth control. Except as noted above in the history of present illness, the review of systems is  negative for headache, vision changes, chest pain, shortness of breath, abdominal pain, urinary sx, bowel changes. Past medical, surgical, and social history reviewed and updated  Medications and allergies reviewed and updated      O: /68   Pulse 57   Temp 97.8 °F (36.6 °C) (Temporal)   Resp 14   Wt 119 lb 12.8 oz (54.3 kg)   SpO2 98%   BMI 20.69 kg/m²   GEN: No acute distress, cooperative, well nourished, alert. HEENT: PEERLA, EOMI , normocephalic/atraumatic, nares and oropharynx clear. Mucous membranes normal, Tympanic membranes clear bilaterally. Neck: soft, supple, no thyromegaly, mass, no Lymphadenopathy  CV: Regular rate and rhythm, no murmur, rubs, gallops. No edema. Resp: Clear to auscultation bilaterally good air entry bilaterally  No crackles, wheeze. Breathing comfortably. Psych: mood stable, No suicidal thoughts or ideation         Current Outpatient Medications   Medication Sig Dispense Refill    therapeutic multivitamin-minerals (THERAGRAN-M) tablet Take 1 tablet by mouth daily       No current facility-administered medications for this visit. ASSESSMENT / PLAN:    1.  Migraine without aura and without status migrainosus, not

## 2023-05-24 RX ORDER — RIZATRIPTAN BENZOATE 10 MG/1
10 TABLET ORAL DAILY PRN
Qty: 9 TABLET | Refills: 5 | Status: SHIPPED | OUTPATIENT
Start: 2023-05-24

## 2024-01-08 RX ORDER — RIZATRIPTAN BENZOATE 10 MG/1
10 TABLET ORAL DAILY PRN
Qty: 9 TABLET | Refills: 5 | Status: SHIPPED | OUTPATIENT
Start: 2024-01-08

## 2024-10-08 RX ORDER — RIZATRIPTAN BENZOATE 10 MG/1
10 TABLET ORAL DAILY PRN
Qty: 9 TABLET | Refills: 5 | Status: SHIPPED | OUTPATIENT
Start: 2024-10-08

## 2025-02-19 ENCOUNTER — OFFICE VISIT (OUTPATIENT)
Dept: FAMILY MEDICINE CLINIC | Age: 40
End: 2025-02-19

## 2025-02-19 VITALS
WEIGHT: 106 LBS | TEMPERATURE: 98.5 F | HEIGHT: 64 IN | DIASTOLIC BLOOD PRESSURE: 62 MMHG | HEART RATE: 91 BPM | OXYGEN SATURATION: 98 % | SYSTOLIC BLOOD PRESSURE: 104 MMHG | BODY MASS INDEX: 18.1 KG/M2

## 2025-02-19 DIAGNOSIS — G43.009 MIGRAINE WITHOUT AURA AND WITHOUT STATUS MIGRAINOSUS, NOT INTRACTABLE: ICD-10-CM

## 2025-02-19 DIAGNOSIS — F41.9 ANXIETY: ICD-10-CM

## 2025-02-19 DIAGNOSIS — M06.09 RHEUMATOID ARTHRITIS OF MULTIPLE SITES WITH NEGATIVE RHEUMATOID FACTOR (HCC): ICD-10-CM

## 2025-02-19 DIAGNOSIS — R63.0 ANOREXIA: ICD-10-CM

## 2025-02-19 DIAGNOSIS — B34.9 VIRAL ILLNESS: Primary | ICD-10-CM

## 2025-02-19 PROBLEM — R07.9 LEFT-SIDED CHEST PAIN: Status: RESOLVED | Noted: 2019-08-17 | Resolved: 2025-02-19

## 2025-02-19 PROBLEM — I31.9 PERICARDITIS: Status: RESOLVED | Noted: 2019-08-17 | Resolved: 2025-02-19

## 2025-02-19 PROBLEM — R42 DIZZINESS: Status: RESOLVED | Noted: 2019-10-02 | Resolved: 2025-02-19

## 2025-02-19 PROBLEM — M19.90 INFLAMMATORY ARTHRITIS: Status: RESOLVED | Noted: 2020-05-19 | Resolved: 2025-02-19

## 2025-02-19 LAB
INFLUENZA A ANTIBODY: NORMAL
INFLUENZA B ANTIBODY: NORMAL
S PYO AG THROAT QL: NORMAL

## 2025-02-19 RX ORDER — ONDANSETRON 4 MG/1
4 TABLET, ORALLY DISINTEGRATING ORAL 3 TIMES DAILY PRN
Qty: 21 TABLET | Refills: 0 | Status: SHIPPED | OUTPATIENT
Start: 2025-02-19

## 2025-02-19 SDOH — ECONOMIC STABILITY: FOOD INSECURITY: WITHIN THE PAST 12 MONTHS, YOU WORRIED THAT YOUR FOOD WOULD RUN OUT BEFORE YOU GOT MONEY TO BUY MORE.: PATIENT DECLINED

## 2025-02-19 SDOH — ECONOMIC STABILITY: FOOD INSECURITY: WITHIN THE PAST 12 MONTHS, THE FOOD YOU BOUGHT JUST DIDN'T LAST AND YOU DIDN'T HAVE MONEY TO GET MORE.: PATIENT DECLINED

## 2025-02-19 ASSESSMENT — PATIENT HEALTH QUESTIONNAIRE - PHQ9
SUM OF ALL RESPONSES TO PHQ QUESTIONS 1-9: 0
SUM OF ALL RESPONSES TO PHQ QUESTIONS 1-9: 0
SUM OF ALL RESPONSES TO PHQ9 QUESTIONS 1 & 2: 0
SUM OF ALL RESPONSES TO PHQ QUESTIONS 1-9: 0
1. LITTLE INTEREST OR PLEASURE IN DOING THINGS: NOT AT ALL
DEPRESSION UNABLE TO ASSESS: FUNCTIONAL CAPACITY MOTIVATION LIMITS ACCURACY
SUM OF ALL RESPONSES TO PHQ QUESTIONS 1-9: 0
2. FEELING DOWN, DEPRESSED OR HOPELESS: NOT AT ALL

## 2025-02-19 ASSESSMENT — ENCOUNTER SYMPTOMS: COUGH: 1

## 2025-02-19 NOTE — PROGRESS NOTES
Gloria King (:  1985) is a 39 y.o. female,Established patient, here for evaluation of the following chief complaint(s):  Cough (Sore throat, mild fever, body aches)         Assessment & Plan  Viral illness   New, uncertain prognosis,  negative flu, negative strep, negative COVID.  and medication adherence emphasized    Orders:    POCT Influenza A/B    ondansetron (ZOFRAN-ODT) 4 MG disintegrating tablet; Take 1 tablet by mouth 3 times daily as needed for Nausea or Vomiting    POCT rapid strep A  Additionally patient has a physical with her PCP next week.  She is to follow-up with us in the meantime if she has any worsening of conditions or lack of improvement over the next few days.  Encouraged her to continue with fluids to maintain adequate hydration  Rheumatoid arthritis of multiple sites with negative rheumatoid factor (HCC)   Monitored by specialist- no acute findings meriting change in the plan         Migraine without aura and without status migrainosus, not intractable   Monitored by specialist- no acute findings meriting change in the plan         Anorexia   Monitored by specialist- no acute findings meriting change in the plan         Anxiety   Monitored by specialist- no acute findings meriting change in the plan           No follow-ups on file.       Subjective   Patient presents today with complaints of 3 days of cough, fever, headache, myalgias.  States she is got a lot of fluid in the household in addition to strep.  She would like to be tested for this.  She has been treating herself with over-the-counter Tylenol and Motrin for symptom relief.  She denies any chest pain, shortness of breath, abdominal pain, nausea vomiting diarrhea.    Cough      No Known Allergies    Current Outpatient Medications   Medication Sig Dispense Refill    ondansetron (ZOFRAN-ODT) 4 MG disintegrating tablet Take 1 tablet by mouth 3 times daily as needed for Nausea or Vomiting 21 tablet 0    rizatriptan

## 2025-02-21 ENCOUNTER — TELEPHONE (OUTPATIENT)
Dept: FAMILY MEDICINE CLINIC | Age: 40
End: 2025-02-21

## 2025-02-21 NOTE — TELEPHONE ENCOUNTER
Pt was seen 2/19/25 by Vikki but no medication was prescribed and she states she is still not feeling well and has not been out of bed since. She has a fever, cough, sore throat, headache, body aches, and she says she is feeling nauseas and has diarrhea.      She wants to know if there is anything that can be called in for her or other options she can try. Please reach out to the patient.     [phone]

## 2025-02-21 NOTE — TELEPHONE ENCOUNTER
Pt called again wanting to schedule an appointment, there are no available times today. She wants to speak to someone in the office that can talk to her about her symptoms.     [phone]

## 2025-02-21 NOTE — TELEPHONE ENCOUNTER
MA spoke with patient regarding symptoms. Patient states they are getting worse than when they were in office for acute care. MA offered next available appt with BP or an NP (2/25/25), and patient stated they have an appt already for that day with BP for physical. MA advised plenty of fluids, Tylenol for fever, and OTC decongestant such as Mucinex DM for flu like symptoms. MA also advised patient that if symptoms become worse or remain before appt to go to ED or urgent care. Patient was negative for Flu, Covid, and strep at OV. Patient requested antibiotic, but MA looked at dx for OV on 2/19/25 and was dx with viral illness. MA advised patient that antibiotic will not help with viral illness, only bacterial. Patient stated that our assistance is not helping them feel better at all.

## 2025-02-25 ENCOUNTER — OFFICE VISIT (OUTPATIENT)
Dept: FAMILY MEDICINE CLINIC | Age: 40
End: 2025-02-25
Payer: COMMERCIAL

## 2025-02-25 VITALS
WEIGHT: 103 LBS | HEART RATE: 99 BPM | DIASTOLIC BLOOD PRESSURE: 60 MMHG | TEMPERATURE: 97.4 F | SYSTOLIC BLOOD PRESSURE: 100 MMHG | OXYGEN SATURATION: 98 % | BODY MASS INDEX: 17.79 KG/M2

## 2025-02-25 DIAGNOSIS — F41.8 DEPRESSION WITH ANXIETY: ICD-10-CM

## 2025-02-25 DIAGNOSIS — Z00.00 ROUTINE GENERAL MEDICAL EXAMINATION AT A HEALTH CARE FACILITY: Primary | ICD-10-CM

## 2025-02-25 DIAGNOSIS — J06.9 VIRAL URI: ICD-10-CM

## 2025-02-25 DIAGNOSIS — G43.009 MIGRAINE WITHOUT AURA AND WITHOUT STATUS MIGRAINOSUS, NOT INTRACTABLE: ICD-10-CM

## 2025-02-25 DIAGNOSIS — R63.0 ANOREXIA: ICD-10-CM

## 2025-02-25 DIAGNOSIS — R53.83 FATIGUE, UNSPECIFIED TYPE: ICD-10-CM

## 2025-02-25 DIAGNOSIS — R63.4 WEIGHT LOSS: ICD-10-CM

## 2025-02-25 PROCEDURE — 99396 PREV VISIT EST AGE 40-64: CPT | Performed by: FAMILY MEDICINE

## 2025-02-25 RX ORDER — RIZATRIPTAN BENZOATE 10 MG/1
10 TABLET ORAL DAILY PRN
Qty: 9 TABLET | Refills: 5 | Status: SHIPPED | OUTPATIENT
Start: 2025-02-25

## 2025-02-25 RX ORDER — DESVENLAFAXINE 25 MG/1
25 TABLET, EXTENDED RELEASE ORAL DAILY
Qty: 30 TABLET | Refills: 5 | Status: SHIPPED | OUTPATIENT
Start: 2025-02-25

## 2025-02-25 NOTE — PROGRESS NOTES
Here for well checkup, physical.  Pt states that she had URI sx, malaise, and lethargy, as well as nausea, and loose bowels.  No emesis.  Pt did come in and was tested negative for flu, covid.  Her family was sick as well.  They did test + for flu.  Pt felt she did get malaise, and dehydration.  Pt ended up going to urgent care and they felt that shew as ok and not overly dehydrated and sx have improved at this time.  Pt does not feel 100% but is still with malaise.  Pt is doing better with hydration and eating more.      Pt has struggled a bit with her weight and with her eating disorder history.   Pt states she felt she started with flare of issues related to her eating d/o in August.  Pt is working closely with her psychologist.  Pt does feel that a lot of her stressors are related to having 4 kids and the struggle to get them where they need to be.  Does get support from  but he is also busy with work.  It has gotten a little easier with youngest going in to .  Needs to have a goal for the summer.  Pt is interested in discussion of meds for depression and anxiety      Except as noted in the history of present illness as above, the review of systems is negative for the following:    General ROS: negative  Psychological ROS: negative  Allergy and Immunology ROS: negative  Hematological and Lymphatic ROS: negative  Respiratory ROS: no cough, shortness of breath, or wheezing  Cardiovascular ROS: no chest pain or dyspnea on exertion  Gastrointestinal ROS: no abdominal pain, change in bowel habits, or black or bloody stools  Genito-Urinary ROS: no dysuria, trouble voiding, or hematuria  Musculoskeletal ROS: negative  Dermatological ROS: negative      Past medical, surgical, and social history reviewed and updated.   Medications and allergies reviewed and updated      /60 (Site: Left Upper Arm, Position: Sitting, Cuff Size: Medium Adult)   Pulse 99   Temp 97.4 °F (36.3 °C) (Temporal)   Wt 46.7

## 2025-02-26 DIAGNOSIS — R63.0 ANOREXIA: ICD-10-CM

## 2025-02-26 DIAGNOSIS — Z00.00 ROUTINE GENERAL MEDICAL EXAMINATION AT A HEALTH CARE FACILITY: ICD-10-CM

## 2025-02-26 LAB
25(OH)D3 SERPL-MCNC: 17.8 NG/ML
ALBUMIN SERPL-MCNC: 4.4 G/DL (ref 3.4–5)
ALBUMIN/GLOB SERPL: 1.8 {RATIO} (ref 1.1–2.2)
ALP SERPL-CCNC: 75 U/L (ref 40–129)
ALT SERPL-CCNC: 83 U/L (ref 10–40)
ANION GAP SERPL CALCULATED.3IONS-SCNC: 11 MMOL/L (ref 3–16)
AST SERPL-CCNC: 61 U/L (ref 15–37)
BASOPHILS # BLD: 0 K/UL (ref 0–0.2)
BASOPHILS NFR BLD: 0.6 %
BILIRUB SERPL-MCNC: 0.3 MG/DL (ref 0–1)
BUN SERPL-MCNC: 10 MG/DL (ref 7–20)
CALCIUM SERPL-MCNC: 9.7 MG/DL (ref 8.3–10.6)
CHLORIDE SERPL-SCNC: 103 MMOL/L (ref 99–110)
CHOLEST SERPL-MCNC: 157 MG/DL (ref 0–199)
CO2 SERPL-SCNC: 24 MMOL/L (ref 21–32)
CREAT SERPL-MCNC: 0.8 MG/DL (ref 0.6–1.1)
DEPRECATED RDW RBC AUTO: 12.6 % (ref 12.4–15.4)
EOSINOPHIL # BLD: 0 K/UL (ref 0–0.6)
EOSINOPHIL NFR BLD: 1.4 %
EST. AVERAGE GLUCOSE BLD GHB EST-MCNC: 102.5 MG/DL
FERRITIN SERPL IA-MCNC: 120 NG/ML (ref 15–150)
FOLATE SERPL-MCNC: 5.16 NG/ML (ref 4.78–24.2)
GFR SERPLBLD CREATININE-BSD FMLA CKD-EPI: >90 ML/MIN/{1.73_M2}
GLUCOSE SERPL-MCNC: 77 MG/DL (ref 70–99)
HBA1C MFR BLD: 5.2 %
HCT VFR BLD AUTO: 39.9 % (ref 36–48)
HDLC SERPL-MCNC: 35 MG/DL (ref 40–60)
HGB BLD-MCNC: 13 G/DL (ref 12–16)
IRON SATN MFR SERPL: 44 % (ref 15–50)
IRON SERPL-MCNC: 130 UG/DL (ref 37–145)
LDLC SERPL CALC-MCNC: 86 MG/DL
LYMPHOCYTES # BLD: 1.7 K/UL (ref 1–5.1)
LYMPHOCYTES NFR BLD: 49.7 %
MCH RBC QN AUTO: 29.9 PG (ref 26–34)
MCHC RBC AUTO-ENTMCNC: 32.5 G/DL (ref 31–36)
MCV RBC AUTO: 91.9 FL (ref 80–100)
MONOCYTES # BLD: 0.4 K/UL (ref 0–1.3)
MONOCYTES NFR BLD: 11.3 %
NEUTROPHILS # BLD: 1.3 K/UL (ref 1.7–7.7)
NEUTROPHILS NFR BLD: 37 %
PLATELET # BLD AUTO: 216 K/UL (ref 135–450)
PMV BLD AUTO: 10.6 FL (ref 5–10.5)
POTASSIUM SERPL-SCNC: 4.2 MMOL/L (ref 3.5–5.1)
PREALB SERPL-MCNC: 25 MG/DL (ref 20–40)
PROT SERPL-MCNC: 6.8 G/DL (ref 6.4–8.2)
RBC # BLD AUTO: 4.34 M/UL (ref 4–5.2)
SODIUM SERPL-SCNC: 138 MMOL/L (ref 136–145)
TIBC SERPL-MCNC: 294 UG/DL (ref 260–445)
TRIGL SERPL-MCNC: 182 MG/DL (ref 0–150)
TSH SERPL DL<=0.005 MIU/L-ACNC: 0.8 UIU/ML (ref 0.27–4.2)
VIT B12 SERPL-MCNC: 1176 PG/ML (ref 211–911)
VLDLC SERPL CALC-MCNC: 36 MG/DL
WBC # BLD AUTO: 3.5 K/UL (ref 4–11)

## 2025-02-27 DIAGNOSIS — R74.01 ELEVATED TRANSAMINASE LEVEL: Primary | ICD-10-CM

## 2025-02-27 DIAGNOSIS — R74.01 ELEVATED TRANSAMINASE LEVEL: ICD-10-CM

## 2025-02-27 LAB
ALBUMIN SERPL-MCNC: 4.3 G/DL (ref 3.4–5)
ALP SERPL-CCNC: 77 U/L (ref 40–129)
ALT SERPL-CCNC: 85 U/L (ref 10–40)
AST SERPL-CCNC: 64 U/L (ref 15–37)
BILIRUB DIRECT SERPL-MCNC: <0.1 MG/DL (ref 0–0.3)
BILIRUB INDIRECT SERPL-MCNC: 0.2 MG/DL (ref 0–1)
BILIRUB SERPL-MCNC: 0.3 MG/DL (ref 0–1)
GGT SERPL-CCNC: 41 U/L (ref 5–36)
PROT SERPL-MCNC: 6.9 G/DL (ref 6.4–8.2)

## 2025-02-27 NOTE — PROGRESS NOTES
MA spoke with patient to relay that more blood tests had been added, and they had already been sent over to the lab.

## 2025-02-28 LAB
CERULOPLASMIN SERPL-MCNC: 21 MG/DL (ref 16–45)
HAV IGM SERPL QL IA: NORMAL
HBV CORE IGM SERPL QL IA: NORMAL
HBV SURFACE AG SERPL QL IA: NORMAL
HCV AB SERPL QL IA: NORMAL

## 2025-03-03 DIAGNOSIS — R74.8 ELEVATED LIVER ENZYMES: Primary | ICD-10-CM

## 2025-03-21 DIAGNOSIS — R74.8 ELEVATED LIVER ENZYMES: ICD-10-CM

## 2025-03-21 LAB
ALBUMIN SERPL-MCNC: 4.4 G/DL (ref 3.4–5)
ALP SERPL-CCNC: 58 U/L (ref 40–129)
ALT SERPL-CCNC: 27 U/L (ref 10–40)
AST SERPL-CCNC: 25 U/L (ref 15–37)
BILIRUB DIRECT SERPL-MCNC: 0.2 MG/DL (ref 0–0.3)
BILIRUB INDIRECT SERPL-MCNC: 0.1 MG/DL (ref 0–1)
BILIRUB SERPL-MCNC: 0.3 MG/DL (ref 0–1)
GGT SERPL-CCNC: 21 U/L (ref 5–36)
PROT SERPL-MCNC: 6.6 G/DL (ref 6.4–8.2)

## 2025-03-22 ENCOUNTER — RESULTS FOLLOW-UP (OUTPATIENT)
Dept: FAMILY MEDICINE CLINIC | Age: 40
End: 2025-03-22

## 2025-04-02 ENCOUNTER — OFFICE VISIT (OUTPATIENT)
Dept: FAMILY MEDICINE CLINIC | Age: 40
End: 2025-04-02
Payer: COMMERCIAL

## 2025-04-02 VITALS
HEIGHT: 64 IN | OXYGEN SATURATION: 95 % | HEART RATE: 90 BPM | BODY MASS INDEX: 17.24 KG/M2 | SYSTOLIC BLOOD PRESSURE: 122 MMHG | WEIGHT: 101 LBS | DIASTOLIC BLOOD PRESSURE: 80 MMHG | TEMPERATURE: 98.5 F

## 2025-04-02 DIAGNOSIS — R79.89 ELEVATED LIVER FUNCTION TESTS: ICD-10-CM

## 2025-04-02 DIAGNOSIS — R63.0 ANOREXIA: Primary | ICD-10-CM

## 2025-04-02 DIAGNOSIS — F41.9 ANXIETY: ICD-10-CM

## 2025-04-02 DIAGNOSIS — E78.6 LOW HDL (UNDER 40): ICD-10-CM

## 2025-04-02 PROCEDURE — G2211 COMPLEX E/M VISIT ADD ON: HCPCS | Performed by: FAMILY MEDICINE

## 2025-04-02 PROCEDURE — 99214 OFFICE O/P EST MOD 30 MIN: CPT | Performed by: FAMILY MEDICINE

## 2025-04-02 RX ORDER — DESVENLAFAXINE 50 MG/1
50 TABLET, FILM COATED, EXTENDED RELEASE ORAL DAILY
Qty: 30 TABLET | Refills: 5 | Status: SHIPPED | OUTPATIENT
Start: 2025-04-02

## 2025-04-02 NOTE — PROGRESS NOTES
Here for f/u and recheck of mood, anorexia. Pt was seen a few weeks ago and we did get started on a low-dose of pristiq.  Pt has noted that it is helpful overall with some improvement in her irritability, feels more patient with her kids and reacting better when she gets overstimulated.  Pt has tolerated well with some mild side effects of sleep changes, that have improved.  Pt is back doing counseling with her therapist and she has back doing weekly, and that has been helpful overall.      Pts weight is similar, working closely with her psychologist and at last visit we did discuss getting to some more aggressive evaluation with outpatient program      Except as noted above in the history of present illness, the review of systems is  negative for headache, vision changes, chest pain, shortness of breath, abdominal pain, urinary sx, bowel changes.    Past medical, surgical, and social history reviewed and updated  Medications and allergies reviewed and updated      O: /80 (BP Site: Left Upper Arm, Patient Position: Sitting, BP Cuff Size: Medium Adult)   Pulse 90   Temp 98.5 °F (36.9 °C)   Ht 1.621 m (5' 3.8\")   Wt 45.8 kg (101 lb)   SpO2 95%   BMI 17.45 kg/m²   GEN: No acute distress, cooperative, well nourished, alert.   HEENT: PEERLA, EOMI , normocephalic/atraumatic, nares and oropharynx clear.  Mucous membranes normal, Tympanic membranes clear bilaterally.  Neck: soft, supple, no thyromegaly, mass, no Lymphadenopathy  CV: Regular rate and rhythm, no murmur, rubs, gallops. No edema.  Resp: Clear to auscultation bilaterally good air entry bilaterally  No crackles, wheeze. Breathing comfortably.   Psych: mood stable, No suicidal thoughts or ideation         ASSESSMENT / PLAN:    1. Anorexia  Weight stable  Continue close f/u with psychology  Information given on eating disorder center that provides additional options for treatment, including PHP, IOP  bloodwork stable and unchanged  F/u 1 month    2.

## 2025-05-14 ENCOUNTER — OFFICE VISIT (OUTPATIENT)
Dept: FAMILY MEDICINE CLINIC | Age: 40
End: 2025-05-14
Payer: COMMERCIAL

## 2025-05-14 VITALS
HEIGHT: 64 IN | TEMPERATURE: 96.6 F | DIASTOLIC BLOOD PRESSURE: 60 MMHG | SYSTOLIC BLOOD PRESSURE: 98 MMHG | OXYGEN SATURATION: 98 % | BODY MASS INDEX: 17.24 KG/M2 | WEIGHT: 101 LBS | HEART RATE: 77 BPM

## 2025-05-14 DIAGNOSIS — Z12.83 SCREENING FOR SKIN CANCER: ICD-10-CM

## 2025-05-14 DIAGNOSIS — F41.9 ANXIETY: Primary | ICD-10-CM

## 2025-05-14 DIAGNOSIS — R63.0 ANOREXIA: ICD-10-CM

## 2025-05-14 DIAGNOSIS — R79.89 ELEVATED LIVER FUNCTION TESTS: ICD-10-CM

## 2025-05-14 PROBLEM — O09.299 PRIOR PREGNANCY COMPLICATED BY SGA (SMALL FOR GESTATIONAL AGE), ANTEPARTUM: Status: RESOLVED | Noted: 2017-03-24 | Resolved: 2025-05-14

## 2025-05-14 PROCEDURE — 99214 OFFICE O/P EST MOD 30 MIN: CPT | Performed by: FAMILY MEDICINE

## 2025-05-14 ASSESSMENT — PATIENT HEALTH QUESTIONNAIRE - PHQ9
1. LITTLE INTEREST OR PLEASURE IN DOING THINGS: NOT AT ALL
SUM OF ALL RESPONSES TO PHQ QUESTIONS 1-9: 0
2. FEELING DOWN, DEPRESSED OR HOPELESS: NOT AT ALL
SUM OF ALL RESPONSES TO PHQ QUESTIONS 1-9: 0

## 2025-05-14 NOTE — PROGRESS NOTES
PEERLA, EOMI , normocephalic/atraumatic, nares and oropharynx clear.  Mucous membranes normal, Tympanic membranes clear bilaterally.  Neck: soft, supple, no thyromegaly, mass, no Lymphadenopathy  CV: Regular rate and rhythm, no murmur, rubs, gallops. No edema.  Resp: Clear to auscultation bilaterally good air entry bilaterally  No crackles, wheeze. Breathing comfortably.   Psych: mood stable, No suicidal thoughts or ideation         Current Outpatient Medications   Medication Sig Dispense Refill    desvenlafaxine succinate (PRISTIQ) 50 MG TB24 extended release tablet Take 1 tablet by mouth daily 30 tablet 5    rizatriptan (MAXALT) 10 MG tablet Take 1 tablet by mouth daily as needed for Migraine May repeat in 2 hours if needed 9 tablet 5    ondansetron (ZOFRAN-ODT) 4 MG disintegrating tablet Take 1 tablet by mouth 3 times daily as needed for Nausea or Vomiting 21 tablet 0    therapeutic multivitamin-minerals (THERAGRAN-M) tablet Take 1 tablet by mouth daily       No current facility-administered medications for this visit.           ASSESSMENT / PLAN:    1. Anxiety  Improved with pristiq  Tolerating great  Continue at 50mg qd  Discussed use, benefit, and side effects of prescribed medications.  Barriers to medication compliance addressed.  All patient questions answered.  Pt voiced understanding.   Cont f/u with counseling    2. Anorexia  Weight stable  Cont close f/u with psychology  Discussed referral to [facility], pt will consider  Has great support system    3. Elevated liver function tests  Repeat normal  monitor    4. Screening for skin cancer  - [facility] - Areli Walls PA, Dermatology, [facility]           Follow-up appointment:   3months    Discussed use, benefit, and side effects of all prescribed medications.  Barriers to medication compliance addressed.  All patient questions answered.  Pt voiced understanding.  When applicable, patient's outside records were reviewed through [facility] Everywhere.  The patient

## 2025-07-16 DIAGNOSIS — B34.9 VIRAL ILLNESS: ICD-10-CM

## 2025-07-16 RX ORDER — ONDANSETRON 4 MG/1
4 TABLET, ORALLY DISINTEGRATING ORAL 3 TIMES DAILY PRN
Qty: 9 TABLET | Refills: 2 | Status: SHIPPED | OUTPATIENT
Start: 2025-07-16

## 2025-07-16 NOTE — TELEPHONE ENCOUNTER
Medication:   Requested Prescriptions     Pending Prescriptions Disp Refills    ondansetron (ZOFRAN-ODT) 4 MG disintegrating tablet [Pharmacy Med Name: ONDANSETRON ODT 4 MG TABLET] 9 tablet 2     Sig: TAKE 1 TABLET BY MOUTH 3 TIMES DAILY AS NEEDED FOR NAUSEA OR VOMITING     Last Filled:  02/19/25    Last appt: 5/14/2025   Next appt: 8/14/2025    Last OARRS:       8/20/2019     7:31 PM   RX Monitoring   Periodic Controlled Substance Monitoring Possible medication side effects, risk of tolerance/dependence & alternative treatments discussed.;No signs of potential drug abuse or diversion identified.;Assessed functional status.

## 2025-08-10 RX ORDER — DESVENLAFAXINE 50 MG/1
50 TABLET, FILM COATED, EXTENDED RELEASE ORAL DAILY
Qty: 90 TABLET | Refills: 1 | Status: SHIPPED | OUTPATIENT
Start: 2025-08-10

## 2025-08-14 ENCOUNTER — OFFICE VISIT (OUTPATIENT)
Dept: FAMILY MEDICINE CLINIC | Age: 40
End: 2025-08-14

## 2025-08-14 VITALS
HEART RATE: 70 BPM | WEIGHT: 102.2 LBS | OXYGEN SATURATION: 98 % | SYSTOLIC BLOOD PRESSURE: 110 MMHG | DIASTOLIC BLOOD PRESSURE: 60 MMHG | BODY MASS INDEX: 17.65 KG/M2

## 2025-08-14 DIAGNOSIS — F41.9 ANXIETY: Primary | ICD-10-CM

## 2025-08-14 DIAGNOSIS — R05.8 DRY COUGH: ICD-10-CM

## 2025-08-14 DIAGNOSIS — R10.2 PELVIC PAIN: ICD-10-CM

## 2025-08-14 DIAGNOSIS — K21.9 GASTROESOPHAGEAL REFLUX DISEASE, UNSPECIFIED WHETHER ESOPHAGITIS PRESENT: ICD-10-CM

## 2025-08-14 DIAGNOSIS — R63.0 ANOREXIA: ICD-10-CM

## 2025-08-14 LAB
BILIRUBIN, POC: NORMAL
BLOOD URINE, POC: NORMAL
CLARITY, POC: CLEAR
COLOR, POC: YELLOW
CONTROL: NORMAL
GLUCOSE URINE, POC: NORMAL MG/DL
KETONES, POC: NORMAL MG/DL
LEUKOCYTE EST, POC: NORMAL
NITRITE, POC: NORMAL
PH, POC: 7
PREGNANCY TEST URINE, POC: NORMAL
PROTEIN, POC: NORMAL MG/DL
SPECIFIC GRAVITY, POC: 1.01
UROBILINOGEN, POC: 0.2 MG/DL

## 2025-08-14 PROCEDURE — 81025 URINE PREGNANCY TEST: CPT | Performed by: FAMILY MEDICINE

## 2025-08-14 PROCEDURE — 81002 URINALYSIS NONAUTO W/O SCOPE: CPT | Performed by: FAMILY MEDICINE

## 2025-08-14 PROCEDURE — 99214 OFFICE O/P EST MOD 30 MIN: CPT | Performed by: FAMILY MEDICINE
